# Patient Record
Sex: FEMALE | Race: WHITE | NOT HISPANIC OR LATINO
[De-identification: names, ages, dates, MRNs, and addresses within clinical notes are randomized per-mention and may not be internally consistent; named-entity substitution may affect disease eponyms.]

---

## 2023-01-30 ENCOUNTER — APPOINTMENT (OUTPATIENT)
Dept: ORTHOPEDIC SURGERY | Facility: CLINIC | Age: 78
End: 2023-01-30
Payer: MEDICARE

## 2023-01-30 DIAGNOSIS — E78.00 PURE HYPERCHOLESTEROLEMIA, UNSPECIFIED: ICD-10-CM

## 2023-01-30 DIAGNOSIS — I10 ESSENTIAL (PRIMARY) HYPERTENSION: ICD-10-CM

## 2023-01-30 DIAGNOSIS — M17.12 UNILATERAL PRIMARY OSTEOARTHRITIS, LEFT KNEE: ICD-10-CM

## 2023-01-30 DIAGNOSIS — M25.569 PAIN IN UNSPECIFIED KNEE: ICD-10-CM

## 2023-01-30 PROCEDURE — 20611 DRAIN/INJ JOINT/BURSA W/US: CPT | Mod: LT

## 2023-01-30 PROCEDURE — 99213 OFFICE O/P EST LOW 20 MIN: CPT | Mod: 25

## 2023-01-30 PROCEDURE — 73564 X-RAY EXAM KNEE 4 OR MORE: CPT | Mod: FY,LT

## 2023-01-30 NOTE — PHYSICAL EXAM
[5___] : quadriceps 5[unfilled]/5 [Negative] : negative Fabiano's [Left] : left knee [All Views] : anteroposterior, lateral, skyline, and anteroposterior standing [advanced tricompartmental OA with medial compartment narrowing and varus alignment] : advanced tricompartmental OA with medial compartment narrowing and varus alignment [] : no ecchymosis [FreeTextEntry9] : unchanged from 2021 [TWNoteComboBox7] : flexion 135 degrees

## 2023-01-30 NOTE — HISTORY OF PRESENT ILLNESS
[4] : 4 [Dull/Aching] : dull/aching [Localized] : localized [Throbbing] : throbbing [Nothing helps with pain getting better] : Nothing helps with pain getting better [de-identified] : she has known OA and has done well with csi in past last one in Jan 2021, no injury, worse when more active, uses salonpas and tylenol, she is on blood thinners [] : no [FreeTextEntry1] : left knee

## 2023-01-30 NOTE — DISCUSSION/SUMMARY
[de-identified] : Patient allowed to gently start resuming activities.\par Discussed change to medication prescription and usage. \par Offered cortisone steroid injection. \par Bracing options discussed with patient. \par Hyaluronic Acid inj pamphlet given to pt.\par poss TKA in future

## 2023-05-28 ENCOUNTER — INPATIENT (INPATIENT)
Facility: HOSPITAL | Age: 78
LOS: 4 days | Discharge: HOME | End: 2023-06-02
Attending: STUDENT IN AN ORGANIZED HEALTH CARE EDUCATION/TRAINING PROGRAM | Admitting: STUDENT IN AN ORGANIZED HEALTH CARE EDUCATION/TRAINING PROGRAM
Payer: MEDICARE

## 2023-05-28 ENCOUNTER — TRANSCRIPTION ENCOUNTER (OUTPATIENT)
Age: 78
End: 2023-05-28

## 2023-05-28 VITALS
TEMPERATURE: 100 F | RESPIRATION RATE: 18 BRPM | SYSTOLIC BLOOD PRESSURE: 126 MMHG | OXYGEN SATURATION: 99 % | HEART RATE: 76 BPM | DIASTOLIC BLOOD PRESSURE: 77 MMHG

## 2023-05-28 DIAGNOSIS — S72.002A FRACTURE OF UNSPECIFIED PART OF NECK OF LEFT FEMUR, INITIAL ENCOUNTER FOR CLOSED FRACTURE: ICD-10-CM

## 2023-05-28 DIAGNOSIS — E78.5 HYPERLIPIDEMIA, UNSPECIFIED: ICD-10-CM

## 2023-05-28 DIAGNOSIS — D72.829 ELEVATED WHITE BLOOD CELL COUNT, UNSPECIFIED: ICD-10-CM

## 2023-05-28 DIAGNOSIS — Z01.818 ENCOUNTER FOR OTHER PREPROCEDURAL EXAMINATION: ICD-10-CM

## 2023-05-28 DIAGNOSIS — I10 ESSENTIAL (PRIMARY) HYPERTENSION: ICD-10-CM

## 2023-05-28 DIAGNOSIS — Z29.9 ENCOUNTER FOR PROPHYLACTIC MEASURES, UNSPECIFIED: ICD-10-CM

## 2023-05-28 DIAGNOSIS — I48.91 UNSPECIFIED ATRIAL FIBRILLATION: ICD-10-CM

## 2023-05-28 DIAGNOSIS — E03.9 HYPOTHYROIDISM, UNSPECIFIED: ICD-10-CM

## 2023-05-28 DIAGNOSIS — M19.90 UNSPECIFIED OSTEOARTHRITIS, UNSPECIFIED SITE: ICD-10-CM

## 2023-05-28 DIAGNOSIS — S72.009A FRACTURE OF UNSPECIFIED PART OF NECK OF UNSPECIFIED FEMUR, INITIAL ENCOUNTER FOR CLOSED FRACTURE: ICD-10-CM

## 2023-05-28 LAB
ALBUMIN SERPL ELPH-MCNC: 3.9 G/DL — SIGNIFICANT CHANGE UP (ref 3.3–5)
ALP SERPL-CCNC: 94 U/L — SIGNIFICANT CHANGE UP (ref 40–120)
ALT FLD-CCNC: 11 U/L — SIGNIFICANT CHANGE UP (ref 4–33)
ANION GAP SERPL CALC-SCNC: 13 MMOL/L — SIGNIFICANT CHANGE UP (ref 7–14)
APTT BLD: 37.9 SEC — HIGH (ref 27–36.3)
AST SERPL-CCNC: 19 U/L — SIGNIFICANT CHANGE UP (ref 4–32)
BASOPHILS # BLD AUTO: 0.05 K/UL — SIGNIFICANT CHANGE UP (ref 0–0.2)
BASOPHILS NFR BLD AUTO: 0.4 % — SIGNIFICANT CHANGE UP (ref 0–2)
BILIRUB SERPL-MCNC: 1.1 MG/DL — SIGNIFICANT CHANGE UP (ref 0.2–1.2)
BLD GP AB SCN SERPL QL: NEGATIVE — SIGNIFICANT CHANGE UP
BUN SERPL-MCNC: 19 MG/DL — SIGNIFICANT CHANGE UP (ref 7–23)
CALCIUM SERPL-MCNC: 9.2 MG/DL — SIGNIFICANT CHANGE UP (ref 8.4–10.5)
CHLORIDE SERPL-SCNC: 103 MMOL/L — SIGNIFICANT CHANGE UP (ref 98–107)
CO2 SERPL-SCNC: 22 MMOL/L — SIGNIFICANT CHANGE UP (ref 22–31)
CREAT SERPL-MCNC: 0.81 MG/DL — SIGNIFICANT CHANGE UP (ref 0.5–1.3)
EGFR: 73 ML/MIN/1.73M2 — SIGNIFICANT CHANGE UP
EOSINOPHIL # BLD AUTO: 0.03 K/UL — SIGNIFICANT CHANGE UP (ref 0–0.5)
EOSINOPHIL NFR BLD AUTO: 0.3 % — SIGNIFICANT CHANGE UP (ref 0–6)
GLUCOSE SERPL-MCNC: 129 MG/DL — HIGH (ref 70–99)
HCT VFR BLD CALC: 33.7 % — LOW (ref 34.5–45)
HGB BLD-MCNC: 11.4 G/DL — LOW (ref 11.5–15.5)
IANC: 9.08 K/UL — HIGH (ref 1.8–7.4)
IMM GRANULOCYTES NFR BLD AUTO: 0.4 % — SIGNIFICANT CHANGE UP (ref 0–0.9)
INR BLD: 1.55 RATIO — HIGH (ref 0.88–1.16)
LYMPHOCYTES # BLD AUTO: 1.9 K/UL — SIGNIFICANT CHANGE UP (ref 1–3.3)
LYMPHOCYTES # BLD AUTO: 15.9 % — SIGNIFICANT CHANGE UP (ref 13–44)
MCHC RBC-ENTMCNC: 29.7 PG — SIGNIFICANT CHANGE UP (ref 27–34)
MCHC RBC-ENTMCNC: 33.8 GM/DL — SIGNIFICANT CHANGE UP (ref 32–36)
MCV RBC AUTO: 87.8 FL — SIGNIFICANT CHANGE UP (ref 80–100)
MONOCYTES # BLD AUTO: 0.81 K/UL — SIGNIFICANT CHANGE UP (ref 0–0.9)
MONOCYTES NFR BLD AUTO: 6.8 % — SIGNIFICANT CHANGE UP (ref 2–14)
NEUTROPHILS # BLD AUTO: 9.08 K/UL — HIGH (ref 1.8–7.4)
NEUTROPHILS NFR BLD AUTO: 76.2 % — SIGNIFICANT CHANGE UP (ref 43–77)
NRBC # BLD: 0 /100 WBCS — SIGNIFICANT CHANGE UP (ref 0–0)
NRBC # FLD: 0 K/UL — SIGNIFICANT CHANGE UP (ref 0–0)
PLATELET # BLD AUTO: 239 K/UL — SIGNIFICANT CHANGE UP (ref 150–400)
POTASSIUM SERPL-MCNC: 3.4 MMOL/L — LOW (ref 3.5–5.3)
POTASSIUM SERPL-SCNC: 3.4 MMOL/L — LOW (ref 3.5–5.3)
PROT SERPL-MCNC: 6.7 G/DL — SIGNIFICANT CHANGE UP (ref 6–8.3)
PROTHROM AB SERPL-ACNC: 18.1 SEC — HIGH (ref 10.5–13.4)
RBC # BLD: 3.84 M/UL — SIGNIFICANT CHANGE UP (ref 3.8–5.2)
RBC # FLD: 12.8 % — SIGNIFICANT CHANGE UP (ref 10.3–14.5)
RH IG SCN BLD-IMP: POSITIVE — SIGNIFICANT CHANGE UP
SODIUM SERPL-SCNC: 138 MMOL/L — SIGNIFICANT CHANGE UP (ref 135–145)
WBC # BLD: 11.92 K/UL — HIGH (ref 3.8–10.5)
WBC # FLD AUTO: 11.92 K/UL — HIGH (ref 3.8–10.5)

## 2023-05-28 PROCEDURE — 73552 X-RAY EXAM OF FEMUR 2/>: CPT | Mod: 26,LT

## 2023-05-28 PROCEDURE — 71045 X-RAY EXAM CHEST 1 VIEW: CPT | Mod: 26

## 2023-05-28 PROCEDURE — 99223 1ST HOSP IP/OBS HIGH 75: CPT | Mod: GC

## 2023-05-28 PROCEDURE — 99285 EMERGENCY DEPT VISIT HI MDM: CPT | Mod: GC

## 2023-05-28 PROCEDURE — 73502 X-RAY EXAM HIP UNI 2-3 VIEWS: CPT | Mod: 26,LT

## 2023-05-28 PROCEDURE — 73721 MRI JNT OF LWR EXTRE W/O DYE: CPT | Mod: 26,LT

## 2023-05-28 PROCEDURE — 70450 CT HEAD/BRAIN W/O DYE: CPT | Mod: 26,MA

## 2023-05-28 PROCEDURE — 72192 CT PELVIS W/O DYE: CPT | Mod: 26,MA

## 2023-05-28 RX ORDER — ACETAMINOPHEN 500 MG
650 TABLET ORAL EVERY 6 HOURS
Refills: 0 | Status: DISCONTINUED | OUTPATIENT
Start: 2023-05-28 | End: 2023-05-29

## 2023-05-28 RX ORDER — SIMVASTATIN 20 MG/1
20 TABLET, FILM COATED ORAL AT BEDTIME
Refills: 0 | Status: DISCONTINUED | OUTPATIENT
Start: 2023-05-28 | End: 2023-06-02

## 2023-05-28 RX ORDER — LEVOTHYROXINE SODIUM 125 MCG
75 TABLET ORAL DAILY
Refills: 0 | Status: DISCONTINUED | OUTPATIENT
Start: 2023-05-28 | End: 2023-06-02

## 2023-05-28 RX ORDER — POTASSIUM CHLORIDE 20 MEQ
40 PACKET (EA) ORAL ONCE
Refills: 0 | Status: COMPLETED | OUTPATIENT
Start: 2023-05-28 | End: 2023-05-28

## 2023-05-28 RX ORDER — METOPROLOL TARTRATE 50 MG
1 TABLET ORAL
Refills: 0 | DISCHARGE

## 2023-05-28 RX ORDER — METOPROLOL TARTRATE 50 MG
5 TABLET ORAL ONCE
Refills: 0 | Status: COMPLETED | OUTPATIENT
Start: 2023-05-28 | End: 2023-05-28

## 2023-05-28 RX ORDER — POVIDONE-IODINE 5 %
1 AEROSOL (ML) TOPICAL ONCE
Refills: 0 | Status: DISCONTINUED | OUTPATIENT
Start: 2023-05-29 | End: 2023-06-02

## 2023-05-28 RX ORDER — LANOLIN ALCOHOL/MO/W.PET/CERES
3 CREAM (GRAM) TOPICAL AT BEDTIME
Refills: 0 | Status: DISCONTINUED | OUTPATIENT
Start: 2023-05-28 | End: 2023-06-02

## 2023-05-28 RX ORDER — RIVAROXABAN 15 MG-20MG
1 KIT ORAL
Refills: 0 | DISCHARGE

## 2023-05-28 RX ORDER — CHLORHEXIDINE GLUCONATE 213 G/1000ML
1 SOLUTION TOPICAL ONCE
Refills: 0 | Status: COMPLETED | OUTPATIENT
Start: 2023-05-29 | End: 2023-05-29

## 2023-05-28 RX ORDER — LEVOTHYROXINE SODIUM 125 MCG
1 TABLET ORAL
Refills: 0 | DISCHARGE

## 2023-05-28 RX ORDER — METOPROLOL TARTRATE 50 MG
25 TABLET ORAL DAILY
Refills: 0 | Status: DISCONTINUED | OUTPATIENT
Start: 2023-05-28 | End: 2023-05-28

## 2023-05-28 RX ORDER — METOPROLOL TARTRATE 50 MG
25 TABLET ORAL DAILY
Refills: 0 | Status: DISCONTINUED | OUTPATIENT
Start: 2023-05-28 | End: 2023-05-29

## 2023-05-28 RX ADMIN — Medication 0.5 MILLIGRAM(S): at 21:12

## 2023-05-28 RX ADMIN — Medication 40 MILLIEQUIVALENT(S): at 17:24

## 2023-05-28 RX ADMIN — SIMVASTATIN 20 MILLIGRAM(S): 20 TABLET, FILM COATED ORAL at 23:55

## 2023-05-28 RX ADMIN — Medication 5 MILLIGRAM(S): at 23:55

## 2023-05-28 NOTE — H&P ADULT - PROBLEM SELECTOR PLAN 7
Hx hypothyroidism  home regimen: Synthroid 75mcg QD    Plan:  -F/U outpatient, pt is monitored closely no need for labs here  -C/W home synthroid 75mcg QD Hx hypothyroidism  Home regimen: Synthroid 75mcg QD    Plan:  -F/u TSH  -C/w home Synthroid 75mcg QD

## 2023-05-28 NOTE — ED ADULT NURSE NOTE - DRUG PRE-SCREENING (DAST -1)
What Type Of Note Output Would You Prefer (Optional)?: Bullet Format How Severe Are Your Spot(S)?: moderate Hpi Title: Evaluation of Skin Lesions Location: Right leg and right distal forearm Year Removed: 2000 and 2018 Statement Selected

## 2023-05-28 NOTE — ED ADULT NURSE REASSESSMENT NOTE - NS ED NURSE REASSESS COMMENT FT1
Spoke to MD Do and made aware of EKG. Patient in no distress. Nursing care continues
pt A&ox4, denies chest pain and SOB. no complaints of pain. denies headache, dizziness ,lightheadedness, radiating chest pain. breathing is spontaneous and unlabored. bed in lowest position, call bell within reach, siderails up x2. no new bruisiing, swelling, or hematoma noted to left leg. pt awaiting CT results.

## 2023-05-28 NOTE — H&P ADULT - PROBLEM SELECTOR PLAN 1
S/p Mechanical fall  Xray with Acute displaced fracture through the left greater trochanter.  CT bony pelvis w/ Comminuted displaced fracture of the left femoral greater trochanter.   CTH wnl     Plan   -MRI (premedicated with ativan due to anxiety)  -F/U Ortho recs, potential OR tomorrow  -NPO@MN  -Preop Labs  -PT eval postop S/p Mechanical fall; no headstrike, no LOC   Xray with Acute displaced fracture through the left greater trochanter.  CT bony pelvis w/ Comminuted displaced fracture of the left femoral greater trochanter.   CTH wnl     Plan   -MRI L hip to r/o IT extension (premedicated with ativan due to anxiety)- performed F/U Read   -F/U Ortho recs, potential OR tomorrow  -NPO@MN  -Preop Labs  -PT eval postop S/p mechanical fall with no head strike, no LOC   Xray with acute displaced fracture through the left greater trochanter. CT bony pelvis w/ comminuted displaced fracture of the left femoral greater trochanter.   CTH noncontrast wnl     Plan   -MRI L hip to r/o IT extension (premedicated with Ativan due to anxiety), performed. F/u read.  -Ortho consulted, f/u Ortho recs, potential OR tomorrow  -Pain control with PO Tylenol PRN, oxycodone 2.5mg q6hrs PRN for moderate pain, and oxycodone 5mg q6hrs PRN for severe pain  -NPO@MN  -Preop labs  -PT eval postop

## 2023-05-28 NOTE — H&P ADULT - ASSESSMENT
Note incomplete; pt to be seen and assessed; plan tentative      78F hx Afib (on xarleo LD __), HTN, HLD c/o L hip pain s/p mechanical fall 4 days ago. Since the incident, able to partially bear weight in the LLE with a walker (denies walker use at baseline, reports she ambulates w/o assistive devices normally). Denies headstrike or LOC. Denies numbness/tingling in the LLE. Denies any other trauma/injuries at this time.    ED Course: febrile, HR 70s-80s, SBP 120s-140s/70s-90s, sat 99 on RA , RR 18.  Recieved oral potassium supplementation    #Hip Fracture  fall    Plan   -MRI  -Ortho  -NPO@MN  -Preop Labs  -PT postop     ##Medical optimization  ECG  chadvasc  ahsbleed  afib  htn  hld  RCRI       #HTN  Home regimen: ?    #HLD  Home regimen: ?    Plan  -Lipid Panel     #Prophylactic  DVT: Hold  Diet: DASH until MN then NPO  Endo: A1C  CV: Lipid Panel    Dispo: inpatient for OR    Outpatient follow up:  PCP  ?cards  Ortho 78F hx Afib (on Xarelto LD 5/27), HTN, HLD, OA (L knee; cortisone shot 2/23) c/o L hip pain s/p mechanical fall 4 days ago. Limited weight bearing. Xray with Acute displaced fracture through the left greater trochanter.  CT w/ Comminuted displaced fracture of the left femoral greater trochanter.       ED Course: febrile, HR 70s-80s, SBP 120s-140s/70s-90s, sat 99 on RA , RR 18. Received oral potassium supplementation. Ativan for anxiety pre MRI.   78F hx Afib (on Xarelto LD 5/27), HTN, HLD, OA (L knee; cortisone shot 2/23) c/o L hip pain s/p mechanical fall 4 days ago. Limited weight bearing. Xray with acute displaced fracture through the left greater trochanter. CT w/ comminuted displaced fracture of the left femoral greater trochanter.       ED Course: febrile, HR 70s-80s, SBP 120s-140s/70s-90s, sat 99 on RA , RR 18. Received oral potassium supplementation. Ativan for anxiety pre MRI.

## 2023-05-28 NOTE — ED PROVIDER NOTE - OBJECTIVE STATEMENT
78 year old with history of HTN, HLD, Afib on Xarelto, presenting from  for possible hip fracture s/p fall. 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

## 2023-05-28 NOTE — H&P ADULT - PROBLEM SELECTOR PLAN 8
Leuckocytosis on admission  no fevers  did feel a little cold day before admission  no chills here  CXR with clear lungs  no localizing infectious symptoms     Plan  -CTM off antibiotics Leukocytosis on admission, with WBC 11.92.  Pt afebrile, did feel a little cold day before admission, no chills here.  CXR with clear lungs.  No localizing infectious symptoms.   Likely reactive in setting of acute L hip fracture.     Plan:  -Check UA (though pt not reporting any urinary symptoms)  -Trend CBC   -CTM off antibiotics

## 2023-05-28 NOTE — ED ADULT NURSE NOTE - SUICIDE SCREENING QUESTION 2
Pachymetry Report   [phone]    Indication: Glaucoma    Right eye:  Central corneal thickness: 577 in microns  Classification: Thicker than average  Interpretation: True IOP may be meaningfully lower than indicated by tonometry    Left eye:  Central corneal thickness: 522 in microns  Classification: Average  Interpretation: Tonometric readings are sufficiently accurate      Judit Catalan, OD   No

## 2023-05-28 NOTE — H&P ADULT - NSHPPHYSICALEXAM_GEN_ALL_CORE
Note incomplete; pt to be seen and assessed; plan tentative T(C): 37.4 (05-28-23 @ 21:12), Max: 37.7 (05-28-23 @ 15:21)  HR: 121 (05-28-23 @ 21:12) (76 - 121)  BP: 125/67 (05-28-23 @ 21:12) (125/67 - 147/96)  RR: 19 (05-28-23 @ 21:12) (18 - 19)  SpO2: 99% (05-28-23 @ 21:12) (99% - 99%)    CONSTITUTIONAL: anxious regarding MRI and possible OR   EYES:, EOMI, No conjunctival or scleral injection, non-icteric  ENMT: Oral mucosa with moist membranes. Normal dentition; no pharyngeal injection or exudates   NECK: symmetric and without tracheal deviation   RESP: No respiratory distress, no use of accessory muscles; CTA b/l, no WRR  CV: Fast and irregular, no murmurs   GI: Soft, NT, ND, no rebound, no guarding; no palpable masses; no hepatosplenomegaly; no hernia palpated  MSK: TTP at left hip, WWP distally  SKIN: +ecchymosis expanding form L hip past L knee; otherwise no rashes or ulcers noted; no subcutaneous nodules or induration palpable  NEURO: CARRERO, ambulatory with some weight bearing, EOMI, conversational, no facial assymetry, speech clear and fluent, follows all commands   PSYCH: Appropriate insight/judgment; A+O x 3, mood and affect appropriate, recent/remote memory intact T(C): 37.4 (05-28-23 @ 21:12), Max: 37.7 (05-28-23 @ 15:21)  HR: 121 (05-28-23 @ 21:12) (76 - 121)  BP: 125/67 (05-28-23 @ 21:12) (125/67 - 147/96)  RR: 19 (05-28-23 @ 21:12) (18 - 19)  SpO2: 99% (05-28-23 @ 21:12) (99% - 99%)    CONSTITUTIONAL: Awake and alert, NAD, anxious regarding MRI and possible OR   HEAD: Normocephalic, atraumatic  EYES: EOMI, PERRL, no conjunctival or scleral injection, non-icteric  MOUTH: Oral mucosa with moist membranes. Normal dentition. No pharyngeal injection or exudates.  NECK: Supple, full ROM, no JVD, no LAD, symmetric and without tracheal deviation   RESP: No respiratory distress, no use of accessory muscles. Good inspiratory effort, CTA b/l, no WRR.  CV: Irregularly irregular. S1 and S2 presents. No murmurs, rubs, or gallops. No LE pitting edema b/l.   GI: BS+, soft, NT, ND, no rebound, no guarding. No palpable masses. No hepatosplenomegaly.  MSK:TTP at left hip, WWP distally. No spinal or paraspinal tenderness.  SKIN: Ecchymosis extending from L hip past L knee. No rashes or ulcers.  NEURO: No facial asymmetry, CARRERO, tactile sensation intact in all extremities, ambulatory with some weight bearing, conversational, speech clear and fluent, follows all commands   PSYCH: Appropriate insight/judgment; A&O x 3, mood and affect appropriate, recent/remote memory intact

## 2023-05-28 NOTE — CONSULT NOTE ADULT - SUBJECTIVE AND OBJECTIVE BOX
HPI  78yFemale c/o L hip pain s/p mechanical fall 4 days ago. Since the incident, able to partially bear weight in the LLE with a walker (denies walker use at baseline, reports she ambulates w/o assistive devices normally). Denies headstrike or LOC. Denies numbness/tingling in the LLE. Denies any other trauma/injuries at this time.    ROS  Negative unless otherwise specified in HPI.    PAST MEDICAL & SURGICAL Hx  PAST MEDICAL & SURGICAL HISTORY:    MEDICATIONS  Home Medications:  busPIRone: 15 milligram(s) orally once a day. 1/2 tablet (05 Jan 2014 13:58)  levothyroxine 88 mcg (0.088 mg) oral capsule: 1 cap(s) orally once a day (05 Jan 2014 13:58)  simvastatin 20 mg oral tablet: 1 tab(s) orally once a day (at bedtime) (05 Jan 2014 13:58)  verapamil 40 mg oral tablet: tab(s) orally once a day (05 Jan 2014 13:58)    VITALS  Vital Signs Last 24 Hrs  T(C): 36.9 (28 May 2023 17:27), Max: 37.7 (28 May 2023 15:21)  T(F): 98.4 (28 May 2023 17:27), Max: 99.9 (28 May 2023 15:21)  HR: 84 (28 May 2023 17:27) (76 - 84)  BP: 147/96 (28 May 2023 17:27) (126/77 - 147/96)  RR: 18 (28 May 2023 17:27) (18 - 18)  SpO2: 99% (28 May 2023 17:27) (99% - 99%)  Parameters below as of 28 May 2023 17:27  Patient On (Oxygen Delivery Method): room air    PHYSICAL EXAM  Gen: Lying in bed, NAD  Resp: No increased WOB  LLE:  Skin intact, +edema and +ecchymosis over hip and down thigh  +Mild TTP over L hip, no TTP along remainder of extremity; compartments soft  (-) Log roll test  (-) Pain with axial loading  Motor: TA/EHL/GS/FHL intact  Sensory: DP/SP/Tib/Louann/Saph SILT  +DP pulse, WWP    Secondary survey:  No TTP along other extremities, pelvis grossly stable, SILT and compartments soft throughout    LABS                        11.4   11.92 )-----------( 239      ( 28 May 2023 14:35 )             33.7     05-28    138  |  103  |  19  ----------------------------<  129<H>  3.4<L>   |  22  |  0.81    Ca    9.2      28 May 2023 14:35    TPro  6.7  /  Alb  3.9  /  TBili  1.1  /  DBili  x   /  AST  19  /  ALT  11  /  AlkPhos  94  05-28    PT/INR - ( 28 May 2023 14:35 )   PT: 18.1 sec;   INR: 1.55 ratio    PTT - ( 28 May 2023 14:35 )  PTT:37.9 sec    IMAGING  XRs: L greater troch fx, unable to determine if IT extension present on these XRs alone (personal read)    ASSESSMENT & PLAN  78yFemale w/ L greater troch fx.  -NWB LLE, bedrest until MRI  -MRI L hip to r/o IT extension  -preop in case MRI shows IT extension: CBC, BMP, coags, T&S x2, CXR, EKG  -NPO past midnight, IVF  -hold chemical DVT ppx for possible OR; SCDs OK  -please document medical clearance for OR in case requires surgery  -pain control  -ice/cold compress HPI  78yFemale c/o L hip pain s/p mechanical fall 4 days ago. Since the incident, able to partially bear weight in the LLE with a walker (denies walker use at baseline, reports she ambulates w/o assistive devices normally). Denies headstrike or LOC. Denies numbness/tingling in the LLE. Denies any other trauma/injuries at this time.    ROS  Negative unless otherwise specified in HPI.    PAST MEDICAL & SURGICAL Hx  PAST MEDICAL & SURGICAL HISTORY:    MEDICATIONS  Home Medications:  busPIRone: 15 milligram(s) orally once a day. 1/2 tablet (05 Jan 2014 13:58)  levothyroxine 88 mcg (0.088 mg) oral capsule: 1 cap(s) orally once a day (05 Jan 2014 13:58)  simvastatin 20 mg oral tablet: 1 tab(s) orally once a day (at bedtime) (05 Jan 2014 13:58)  verapamil 40 mg oral tablet: tab(s) orally once a day (05 Jan 2014 13:58)    VITALS  Vital Signs Last 24 Hrs  T(C): 36.9 (28 May 2023 17:27), Max: 37.7 (28 May 2023 15:21)  T(F): 98.4 (28 May 2023 17:27), Max: 99.9 (28 May 2023 15:21)  HR: 84 (28 May 2023 17:27) (76 - 84)  BP: 147/96 (28 May 2023 17:27) (126/77 - 147/96)  RR: 18 (28 May 2023 17:27) (18 - 18)  SpO2: 99% (28 May 2023 17:27) (99% - 99%)  Parameters below as of 28 May 2023 17:27  Patient On (Oxygen Delivery Method): room air    PHYSICAL EXAM  Gen: Lying in bed, NAD  Resp: No increased WOB  LLE:  Skin intact, +edema and +ecchymosis over hip and down thigh  +Mild TTP over L hip, no TTP along remainder of extremity; compartments soft  (-) Log roll test  (-) Pain with axial loading  Motor: TA/EHL/GS/FHL intact  Sensory: DP/SP/Tib/Louann/Saph SILT  +DP pulse, WWP    Secondary survey:  No TTP along other extremities, pelvis grossly stable, SILT and compartments soft throughout    LABS                        11.4   11.92 )-----------( 239      ( 28 May 2023 14:35 )             33.7     05-28    138  |  103  |  19  ----------------------------<  129<H>  3.4<L>   |  22  |  0.81    Ca    9.2      28 May 2023 14:35    TPro  6.7  /  Alb  3.9  /  TBili  1.1  /  DBili  x   /  AST  19  /  ALT  11  /  AlkPhos  94  05-28    PT/INR - ( 28 May 2023 14:35 )   PT: 18.1 sec;   INR: 1.55 ratio    PTT - ( 28 May 2023 14:35 )  PTT:37.9 sec    IMAGING  XRs: L greater troch fx, unable to determine if IT extension present on these XRs alone (personal read)    ASSESSMENT & PLAN  78yFemale w/ L greater troch fx.  -NWB LLE, bedrest until MRI  -L hip abduction precautions  -MRI L hip to r/o IT extension  -preop in case MRI shows IT extension: CBC, BMP, coags, T&S x2, CXR, EKG  -NPO past midnight, IVF  -hold chemical DVT ppx for possible OR; SCDs OK  -please document medical clearance for OR in case requires surgery  -pain control  -ice/cold compress

## 2023-05-28 NOTE — H&P ADULT - NSHPREVIEWOFSYSTEMS_GEN_ALL_CORE
Note incomplete; pt to be seen and assessed; plan tentative REVIEW OF SYSTEMS:    CONSTITUTIONAL: No weakness, fevers or chills  EYES/ENT: No visual changes;  No vertigo or throat pain   NECK: No pain or stiffness  RESPIRATORY: No cough, wheezing, hemoptysis; No shortness of breath  CARDIOVASCULAR: No chest pain or palpitations  GASTROINTESTINAL: No abdominal or epigastric pain. No nausea, vomiting, or hematemesis; No diarrhea or constipation. No melena or hematochezia.  GENITOURINARY: No dysuria, frequency or hematuria  NEUROLOGICAL: No numbness or weakness  SKIN: +ecchymosis expanding form L hip past L knee; Otherwise no itching, burning, rashes, or lesions   HEME: no easy bruising or unexplained bleeding  PSYCH: +anxiety re MRI and OR plan   All other review of systems is negative unless indicated above. REVIEW OF SYSTEMS:    CONSTITUTIONAL: No weakness, fevers or chills  EYES/ENT: No visual changes;  No vertigo or throat pain   NECK: No pain or stiffness  RESPIRATORY: No cough, wheezing, hemoptysis; No shortness of breath  CARDIOVASCULAR: No chest pain or palpitations  GASTROINTESTINAL: No abdominal or epigastric pain. No nausea, vomiting, or hematemesis; No diarrhea or constipation. No melena or hematochezia.  GENITOURINARY: No dysuria, frequency or hematuria  NEUROLOGICAL: No numbness or weakness  SKIN: +Ecchymosis extending form L hip past L knee; Otherwise no itching, burning, rashes, or lesions   HEME: No easy bruising or unexplained bleeding  PSYCH: +Anxiety re MRI and OR plan   All other review of systems is negative unless indicated above.

## 2023-05-28 NOTE — H&P ADULT - PROBLEM SELECTOR PLAN 9
#Prophylactic  DVT: Hold pharm agents   Diet: DASH until MN then NPO    Dispo: inpatient for possible OR    Outpatient follow up:  PCP: Dr. Osorio Watson  Cards: Tc Castillo MD  Ortho: IKE VASQUEZ MD   Pharmacy: [address] [id number] #Prophylactic  DVT ppx: On Xarelto at home. Currently holding pending possible OR.    Diet: DASH until MN then NPO    Dispo: inpatient for possible OR    Outpatient follow up:  PCP: Dr. Osorio Watson  Cards: Tc Castillo MD  Ortho: IKE VASQUEZ MD   Pharmacy: [address] [id number]

## 2023-05-28 NOTE — H&P ADULT - ATTENDING COMMENTS
79 yo F with history of Afib (on Xarelto LD 5/27), HTN, HLD, and OA (L knee; cortisone shot 2/23) c/o L hip pain s/p mechanical fall 4 days ago, found to have an acute comminuted displaced fracture of the left femoral greater trochanter. MRI L hip ordered to r/o IT extension. Ortho following, possible OR in AM. Pt, however, not currently optimized, as pt in afib with intermittent RVR to 130s, likely driven by anxiety and pain. Cardiology consult obtained overnight. TTE ordered. Will need further evaluation and monitoring prior to OR. Pt's Xarelto on hold pending possible OR. Monitor on tele.

## 2023-05-28 NOTE — CONSULT NOTE ADULT - ATTENDING COMMENTS
s/p ground level fall with displaced L GT fx with IT extension on MRI.  Apprised to risks and benefits of operative intervention and has elected to proceed with short intramedullary nail.  Xarelto has been held for past 48 hours.  Echo wnl.  Medical clearance obtained.

## 2023-05-28 NOTE — H&P ADULT - PROBLEM SELECTOR PLAN 6
follows with ortho IKE VASQUEZ MD  OA of left knee, chronic  Improved s./p cortisone shot in february    Plan:  -Pain control if needed follows with ortho IKE VASQUEZ MD  OA of left knee, chronic  Improved s/p cortisone shot in february    Plan:  -Pain control if needed Follows with Ortho IKE VASQUEZ MD  OA of left knee, chronic.  Improved s/p cortisone shot in February.    Plan:  -Pain control if needed

## 2023-05-28 NOTE — ED ADULT TRIAGE NOTE - CHIEF COMPLAINT QUOTE
Pt AOX4 c/o Left hip pain and injury s/p trip and fall on 5/24, fell on hard floor, takes Xarelto for Afib, has extensive bruising on Left side of leg and hip, seen in urgi-care and sent to ER for eval of fracture and possible infection (pt had chills at home - oral temp 98.4); is ambulating with cane since injury  PMHx of Hypothyroid, HTN, AFib

## 2023-05-28 NOTE — H&P ADULT - NSHPLABSRESULTS_GEN_ALL_CORE
11.4   11.92 )-----------( 239      ( 28 May 2023 14:35 )             33.7     138  |  103  |  19  ----------------------------<  129<H>  3.4<L>   |  22  |  0.81    Ca    9.2      28 May 2023 14:35  TPro  6.7  /  Alb  3.9  /  TBili  1.1  /  DBili  x   /  AST  19  /  ALT  11  /  AlkPhos  94  05-28  PT/INR - ( 28 May 2023 14:35 )   PT: 18.1 sec;   INR: 1.55 ratio     PTT - ( 28 May 2023 14:35 )  PTT:37.9 sec    CAPILLARY BLOOD GLUCOSE    < from: Xray Hip w/ Pelvis 2 or 3 Views, Left (05.28.23 @ 15:10) >    FINDINGS: There is a displaced fracture through the left greater   trochanter. No other fracture or dislocation is seen in the pelvis, left   hip, and left femur. The bilateral hip joint spaces are intact. No   osseous lesion is seen.    IMPRESSION:  Acute displaced fracture through the left greater trochanter.    --- End of Report ---    < end of copied text >    < from: CT Head No Cont (05.28.23 @ 17:03) >      TECHNIQUE: Multiple axial images were obtained at 5 mm intervals from the   skull base to the vertex. Sagittal and coronal reformatted images were   obtained from the axial data set. The images were reviewed in brain and   bone windows.    COMPARISON: None available    FINDINGS: The CT examination demonstrates the ventricles, cisternal   spaces, and cortical sulci to be within normal limits. There is no   midline shift or extra axial collections. The gray white differentiation   appears within normal limits. There is no intracranial hemorrhage or   acute transcortical infarct. The bony windows demonstrates no fractures.   The visualized paranasal sinuses are within normal limits. The mastoid   air cells are well aerated.    IMPRESSION: No acute intracranial abnormality.    < end of copied text >      < from: CT Pelvis Bony Only No Cont (05.28.23 @ 17:04) >    FINDINGS:    Acute comminuted displaced fracture of the left greater trochanter. Left   femoral head remains seated within the acetabulum. Mild bilateral hip   arthrosis. Degenerative changes within the lower lumbar spine, sacroiliac   joints, pubic symphysis.    Soft tissue swelling/hematoma surrounding the fracture site.    Colonic diverticulosis. Leiomyomatous uterus. Atherosclerotic   calcifications are noted.    IMPRESSION:    Comminuted displaced fracture of the left femoral greater trochanter.   Recommend further evaluation with left hip MRI to determine if there is   extension of the fracture into the intertrochanteric region of the left   femoral neck.    --- End of Report ---    < end of copied text > Labs personally reviewed.             11.4 11.92 )-----------( 239      ( 28 May 2023 14:35 )             33.7     138  |  103  |  19  ----------------------------<  129<H>  3.4<L>   |  22  |  0.81    Ca    9.2      28 May 2023 14:35  TPro  6.7  /  Alb  3.9  /  TBili  1.1  /  DBili  x   /  AST  19  /  ALT  11  /  AlkPhos  94  05-28  PT/INR - ( 28 May 2023 14:35 )   PT: 18.1 sec;   INR: 1.55 ratio     PTT - ( 28 May 2023 14:35 )  PTT:37.9 sec    CAPILLARY BLOOD GLUCOSE    Imaging personally reviewed.  < from: Xray Hip w/ Pelvis 2 or 3 Views, Left (05.28.23 @ 15:10) >    FINDINGS: There is a displaced fracture through the left greater   trochanter. No other fracture or dislocation is seen in the pelvis, left   hip, and left femur. The bilateral hip joint spaces are intact. No   osseous lesion is seen.    IMPRESSION:  Acute displaced fracture through the left greater trochanter.    --- End of Report ---    < end of copied text >    < from: CT Head No Cont (05.28.23 @ 17:03) >      TECHNIQUE: Multiple axial images were obtained at 5 mm intervals from the   skull base to the vertex. Sagittal and coronal reformatted images were   obtained from the axial data set. The images were reviewed in brain and   bone windows.    COMPARISON: None available    FINDINGS: The CT examination demonstrates the ventricles, cisternal   spaces, and cortical sulci to be within normal limits. There is no   midline shift or extra axial collections. The gray white differentiation   appears within normal limits. There is no intracranial hemorrhage or   acute transcortical infarct. The bony windows demonstrates no fractures.   The visualized paranasal sinuses are within normal limits. The mastoid   air cells are well aerated.    IMPRESSION: No acute intracranial abnormality.    < end of copied text >      < from: CT Pelvis Bony Only No Cont (05.28.23 @ 17:04) >    FINDINGS:    Acute comminuted displaced fracture of the left greater trochanter. Left   femoral head remains seated within the acetabulum. Mild bilateral hip   arthrosis. Degenerative changes within the lower lumbar spine, sacroiliac   joints, pubic symphysis.    Soft tissue swelling/hematoma surrounding the fracture site.    Colonic diverticulosis. Leiomyomatous uterus. Atherosclerotic   calcifications are noted.    IMPRESSION:    Comminuted displaced fracture of the left femoral greater trochanter.   Recommend further evaluation with left hip MRI to determine if there is   extension of the fracture into the intertrochanteric region of the left   femoral neck.

## 2023-05-28 NOTE — H&P ADULT - PROBLEM SELECTOR PLAN 2
##Medical optimization    3.9 %  30-day risk of death, MI, or cardiac arrest  From [place] 2017, based on pooled data from 5 high quality external validations (4 prospective). These numbers are higher than those often quoted from the now-outdated original study (Sen 1999). See Evidence for details.    No further workup or intervention recommended prior to procedure. Risks and benefits of procedure to be discussed between surgical team and patient. ##Medical optimization    3.9 %  30-day risk of death, MI, or cardiac arrest  From [place] 2017, based on pooled data from 5 high quality external validations (4 prospective). These numbers are higher than those often quoted from the now-outdated original study (Sen 1999). See Evidence for details.    No further medical workup or intervention recommended prior to procedure. Risks and benefits of procedure to be discussed between surgical team and patient.    Pending cardiac eval for cardiac optimization ##Medical optimization  RCRI:   3.9 %  30-day risk of death, MI, or cardiac arrest  From [place] 2017, based on pooled data from 5 high quality external validations (4 prospective). These numbers are higher than those often quoted from the now-outdated original study (Sen 1999). See Evidence for details.    If Heart Rate well controlled by morning then, no further medical workup or intervention recommended prior to procedure.   If Heart rate remains elevated would recommend further medical management prior to procedure.    Pending cardiac eval for cardiac optimization, will call in early morning when cards team available for clearance requests.    Full Risks and benefits of procedure to be discussed between surgical team and patient. - Pt currently in afib with intermittent RVR to 130s. Therefore, Cardiology consult called overnight and TTE ordered. Pt not medically optimized at this time to go to OR. F/u Cardiology recs and TTE results.  - If pt no longer in afib with RVR, then pt's RCRI score 0 (class I risk), equating to a 3.9 %30-day risk of death, MI, or cardiac arrest following surgery.  From [place] 2017, based on pooled data from 5 high quality external validations (4 prospective). These numbers are higher than those often quoted from the now-outdated original study (Sen 1999). See Evidence for details.  - Pt with good functional capacity, with METs at least 4  - Full Risks and benefits of procedure to be discussed between surgical team and pt

## 2023-05-28 NOTE — ED PROVIDER NOTE - ATTENDING CONTRIBUTION TO CARE
This is a pleasant female who is 78 years old she has a history of hypertension hyperlipidemia A-fib on Xarelto.  Patient states that 4 days ago while working she tripped over a students leg and landed on her left side.  Patient states that she was able to ambulate after however she noticed some pain especially when she was going from a sitting to a standing position.  Patient denies any head trauma any loss of consciousness any new pain in her knee or ankle any shortness of breath or chest pain.  Patient states this was mechanical in nature she did not pass out.  Patient noticed extensive bruising of her left lower extremity therefore she went to urgent care where they performed an x-ray of her hip and knee.  X-ray showing fracture of the hip therefore she was told to come to the emergency department for further work-up.  We will obtain basic imaging here as well.  Due to the fact that the patient is on blood thinners although she is neurologically intact we will obtain a CT of the brain.  We will obtain preop labs as this is usually an operative injury.  We have communicated this with the patient as well as her family member which is her son who is at bedside. Last ate last night. Has not taking her blood thinner today.   General: Well appearing, well nourished  Head: Normocephalic, atraumatic  Eyes: Conjunctiva clear, sclera non-icteric, EOM intact, PERRL  Nose: No external lesions, mucosa non-inflamed, septum and turbinates normal  Mouth: Mucous membranes moist, no mucosal lesions.  Neck: Supple  Heart: irreg, no murmur or gallop  Lungs: Clear to auscultation  Abdomen: Bowel sounds present, soft, no tenderness, non distended, no organomegaly, masses  Back: Spine normal without deformity or tenderness, no CVA tenderness  Extremities: No amputations or deformities,  peripheral pulses intact  Musculoskeletal: No crepitation, defects, decreased range of motion, strength intact throughout, pulses intact  LLE- extensive bruising to the LLE compartments are soft no hematoma is felt no ttp no pain with movement senstion and pulses intact knee has no ttp or deformity ankle has no ttp or deformity pt is able to ambulate   Neurologic: No gross deficits CN II-XII intact Sensation to pain, touch, and proprioception normal  Psychiatric: Oriented X3, normal mood and affect  Skin: Warm,dry. Good turgor, , Cap refill <2 seconds

## 2023-05-28 NOTE — H&P ADULT - PROBLEM SELECTOR PLAN 5
Home regimen: Simvastatin 20mg qhS    Plan:  -F/U outpatient, pt is monitored closely no need for labs here  -C/W Simvastatin 20mg qhS Home regimen: Simvastatin 20mg qHS    Plan:  -F/u outpatient, pt is monitored closely, no need for labs here  -C/w Simvastatin 20mg qHS

## 2023-05-28 NOTE — H&P ADULT - NSHPSOCIALHISTORY_GEN_ALL_CORE
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

## 2023-05-28 NOTE — H&P ADULT - HISTORY OF PRESENT ILLNESS
Note incomplete; pt to be seen and assessed; plan tentative    78F hx Afib (on xarleo LD __), HTN, HLD c/o L hip pain s/p mechanical fall 4 days ago. Since the incident, able to partially bear weight in the LLE with a walker (denies walker use at baseline, reports she ambulates w/o assistive devices normally). Denies headstrike or LOC. Denies numbness/tingling in the LLE. Denies any other trauma/injuries at this time.    ED Course: febrile, HR 70s-80s, SBP 120s-140s/70s-90s, sat 99 on RA , RR 18.  Recieved oral potassium supplementation 78F hx Afib (on Xarelto LD 5/27), HTN, HLD, OA (L knee; cortisone shot 2/23) c/o L hip pain s/p mechanical fall 4 days ago. 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 Denies headstrike or LOC. Since then noticed echymosis on Left hip etending down past knee. No pain at rest, but pain with certain positions including walking. She is  able to partially bear weight in the LLE with a walker (denies walker use at baseline, reports she ambulates w/o assistive devices normally).  Denies numbness/tingling in the LLE. Denies any other trauma/injuries at this time.    ED Course: febrile, HR 70s-80s, SBP 120s-140s/70s-90s, sat 99 on RA , RR 18. Received oral potassium supplementation. Ativan for anxiety pre MRI. 78F hx Afib (on Xarelto LD 5/27), HTN, HLD, OA (L knee; cortisone shot 2/23) c/o L hip pain s/p mechanical fall 4 days ago. 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 Denies head strike or LOC. Since then noticed ecchymosis on left hip extending down past left knee. No pain at rest, but pain with certain positions/activity, including walking. She is able to partially bear weight on the LLE with a walker (denies walker use at baseline, reports she ambulates w/o assistive devices normally).  Denies numbness/tingling in the LLE. Denies any other trauma/injuries at this time.    ED Course: febrile, HR 70s-80s, SBP 120s-140s/70s-90s, sat 99 on RA , RR 18. Received oral potassium supplementation. Ativan for anxiety pre MRI.

## 2023-05-28 NOTE — H&P ADULT - PROBLEM SELECTOR PLAN 3
Home reigmen: Metoprolol succinate 25mg QD; Xarelto 20mg QD (last dose 5/27 2pm)  Chads vasc 4: age, HTN, female  Hasbled: 3 for age, htn, medications  ECG on admission: Afib     Plan  -Hold AC pending procedure  -Continue home metoprolol succinate 25mg QD Home reigmen: Metoprolol succinate 25mg QD; Xarelto 20mg QD (last dose 5/27 2pm)  Chads vasc 4: age, HTN, female  Hasbled: 3 for age, htn, medications  ECG on admission: Afib   Tachy on vitals afterwards. anxious at the time     Plan  -Hold AC pending procedure  -Continue home metoprolol succinate 25mg QD Home reigmen: Metoprolol succinate 25mg QD; Xarelto 20mg QD (last dose 5/27 2pm)  Chads vasc 4: age, HTN, female  Hasbled: 3 for age, htn, medications  ECG on admission: Afib   Tachy on vitals afterwards. anxious at the time   Repeat ECG ; pt tearful and anxious about procedure    Plan  -Hold AC pending procedure  -Continue home metoprolol succinate 25mg QD  -Lopressor 5mg IV now and repeat as necessary Home reigmen: Metoprolol succinate 25mg QD; Xarelto 20mg QD (last dose 5/27 2pm)  Chads vasc 4: age, HTN, female  Hasbled: 3 for age, htn, medications  ECG on admission: Afib   Tachy on vitals soon afterwards. anxious at the time  Repeat ECG ; pt tearful and anxious about procedure  Gave lopressor IV 5mg x1 and rate controlled to 105 on vitals    Plan  -Hold AC pending procedure  -Continue home metoprolol succinate 25mg QD  -Repeat Lopressor as necessary  -Cards consult  -CTM on tele Home reigmen: Metoprolol succinate 25mg QD; Xarelto 20mg QD (last dose 5/27 2pm)  Chads vasc 4: age, HTN, female  Hasbled: 3 for age, htn, medications  ECG on admission: Afib   Tachy on vitals soon afterwards. anxious at the time  Repeat ECG ; pt tearful and anxious about procedure  Gave lopressor IV 5mg x1 and rate controlled to 105 on vitals  High rates overall in setting of anxiety, hip fracture, and nearing 24 hours since metop succinate dosing     Plan  -Hold AC pending procedure  -Switch to  metoprolol tartrate 12.5 BID  -Repeat IV Lopressor as necessary  -Cards consult for cardiac optimization as above   -CTM on tele Home reigmen: Metoprolol succinate 25mg QD; Xarelto 20mg QD (last dose 5/27 2pm)  [id number] 4: age, HTN, female  HASBLED: 3 for age, htn, medications  ECG on admission: Afib   Pt with intermittent RVR to 130s on monitor, anxious at the time. HD stable.  Repeat ECG with Afib , pt tearful and anxious about procedure  Gave IV Lopressor 5mg x1 and rate controlled to 105 on monitor  RVR likely in setting of anxiety, pain form hip fracture, and nearing 24 hours since metoprolol succinate dosing     Plan  -Hold Xarelto for now pending procedure  -Switch to metoprolol tartrate 12.5mg BID, titrate dose as indicated  -Repeat IV Lopressor as necessary  -Cardiology consult called overnight, f/u recs  -Check TSH  -TTE ordered  -CTM on tele

## 2023-05-28 NOTE — ED PROVIDER NOTE - NSICDXPASTMEDICALHX_GEN_ALL_CORE_FT
PAST MEDICAL HISTORY:  Afib     HLD (hyperlipidemia)     HTN (hypertension)     OA (osteoarthritis)

## 2023-05-28 NOTE — ED ADULT NURSE NOTE - NSFALLHARMRISKINTERV_ED_ALL_ED
Assistance OOB with selected safe patient handling equipment if applicable/Assistance with ambulation/Communicate risk of Fall with Harm to all staff, patient, and family/Monitor gait and stability/Provide visual cue: red socks, yellow wristband, yellow gown, etc/Reinforce activity limits and safety measures with patient and family/Bed in lowest position, wheels locked, appropriate side rails in place/Call bell, personal items and telephone in reach/Instruct patient to call for assistance before getting out of bed/chair/stretcher/Non-slip footwear applied when patient is off stretcher/[place] to call system/Physically safe environment - no spills, clutter or unnecessary equipment/Purposeful Proactive Rounding/Room/bathroom lighting operational, light cord in reach

## 2023-05-28 NOTE — H&P ADULT - PROBLEM SELECTOR PLAN 4
Home regimen: Verapamil 180mg QD    Plan:  -C/W Home Verapamil 180mg QD  -CTM BPs Home regimen: Verapamil 180mg QD    Plan:  -Hold home Verapamil 180mg QD for now given pt in afib with intermittent RVR  -CTM BPs

## 2023-05-28 NOTE — ED ADULT NURSE NOTE - OBJECTIVE STATEMENT
pt A&ox4, coming to ED from home for fall on Wednesday. 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 denies hitting head, or LOC. pt on Xarelto for hx of Afib. pt was seen in urgent care and told to come to ED. past medical history: HTN, HLD, hypothyroidism. bruising noted starting from posterior left calf to left hip. pt denies Chest pain and SOB. breathing is spontaneous and unlabored. sating 99% on RA. bilateral pedal and radial pulses palpable and strong. Bed in lowest position, call bell within reach, all other safety and comfort measures provided. awaiting labs results and further orders.

## 2023-05-29 LAB
A1C WITH ESTIMATED AVERAGE GLUCOSE RESULT: 5.4 % — SIGNIFICANT CHANGE UP (ref 4–5.6)
ANION GAP SERPL CALC-SCNC: 11 MMOL/L — SIGNIFICANT CHANGE UP (ref 7–14)
ANION GAP SERPL CALC-SCNC: 12 MMOL/L — SIGNIFICANT CHANGE UP (ref 7–14)
APPEARANCE UR: ABNORMAL
APTT BLD: 34.2 SEC — SIGNIFICANT CHANGE UP (ref 27–36.3)
BACTERIA # UR AUTO: NEGATIVE — SIGNIFICANT CHANGE UP
BILIRUB UR-MCNC: NEGATIVE — SIGNIFICANT CHANGE UP
BLD GP AB SCN SERPL QL: NEGATIVE — SIGNIFICANT CHANGE UP
BUN SERPL-MCNC: 15 MG/DL — SIGNIFICANT CHANGE UP (ref 7–23)
BUN SERPL-MCNC: 17 MG/DL — SIGNIFICANT CHANGE UP (ref 7–23)
CALCIUM SERPL-MCNC: 8.7 MG/DL — SIGNIFICANT CHANGE UP (ref 8.4–10.5)
CALCIUM SERPL-MCNC: 9 MG/DL — SIGNIFICANT CHANGE UP (ref 8.4–10.5)
CHLORIDE SERPL-SCNC: 105 MMOL/L — SIGNIFICANT CHANGE UP (ref 98–107)
CHLORIDE SERPL-SCNC: 106 MMOL/L — SIGNIFICANT CHANGE UP (ref 98–107)
CHOLEST SERPL-MCNC: 165 MG/DL — SIGNIFICANT CHANGE UP
CO2 SERPL-SCNC: 20 MMOL/L — LOW (ref 22–31)
CO2 SERPL-SCNC: 22 MMOL/L — SIGNIFICANT CHANGE UP (ref 22–31)
COLOR SPEC: YELLOW — SIGNIFICANT CHANGE UP
CREAT SERPL-MCNC: 0.75 MG/DL — SIGNIFICANT CHANGE UP (ref 0.5–1.3)
CREAT SERPL-MCNC: 0.79 MG/DL — SIGNIFICANT CHANGE UP (ref 0.5–1.3)
DIFF PNL FLD: ABNORMAL
EGFR: 76 ML/MIN/1.73M2 — SIGNIFICANT CHANGE UP
EGFR: 80 ML/MIN/1.73M2 — SIGNIFICANT CHANGE UP
EPI CELLS # UR: 7 /HPF — HIGH (ref 0–5)
ESTIMATED AVERAGE GLUCOSE: 108 — SIGNIFICANT CHANGE UP
GLUCOSE BLDC GLUCOMTR-MCNC: 123 MG/DL — HIGH (ref 70–99)
GLUCOSE BLDC GLUCOMTR-MCNC: 99 MG/DL — SIGNIFICANT CHANGE UP (ref 70–99)
GLUCOSE SERPL-MCNC: 112 MG/DL — HIGH (ref 70–99)
GLUCOSE SERPL-MCNC: 118 MG/DL — HIGH (ref 70–99)
GLUCOSE UR QL: NEGATIVE — SIGNIFICANT CHANGE UP
HCT VFR BLD CALC: 31.9 % — LOW (ref 34.5–45)
HCT VFR BLD CALC: 35 % — SIGNIFICANT CHANGE UP (ref 34.5–45)
HDLC SERPL-MCNC: 48 MG/DL — LOW
HGB BLD-MCNC: 10.6 G/DL — LOW (ref 11.5–15.5)
HGB BLD-MCNC: 11.8 G/DL — SIGNIFICANT CHANGE UP (ref 11.5–15.5)
HYALINE CASTS # UR AUTO: 0 /LPF — SIGNIFICANT CHANGE UP (ref 0–7)
INR BLD: 1.34 RATIO — HIGH (ref 0.88–1.16)
KETONES UR-MCNC: ABNORMAL
LEUKOCYTE ESTERASE UR-ACNC: ABNORMAL
LIPID PNL WITH DIRECT LDL SERPL: 98 MG/DL — SIGNIFICANT CHANGE UP
MAGNESIUM SERPL-MCNC: 2 MG/DL — SIGNIFICANT CHANGE UP (ref 1.6–2.6)
MCHC RBC-ENTMCNC: 30.6 PG — SIGNIFICANT CHANGE UP (ref 27–34)
MCHC RBC-ENTMCNC: 30.6 PG — SIGNIFICANT CHANGE UP (ref 27–34)
MCHC RBC-ENTMCNC: 33.2 GM/DL — SIGNIFICANT CHANGE UP (ref 32–36)
MCHC RBC-ENTMCNC: 33.7 GM/DL — SIGNIFICANT CHANGE UP (ref 32–36)
MCV RBC AUTO: 90.7 FL — SIGNIFICANT CHANGE UP (ref 80–100)
MCV RBC AUTO: 92.2 FL — SIGNIFICANT CHANGE UP (ref 80–100)
NITRITE UR-MCNC: NEGATIVE — SIGNIFICANT CHANGE UP
NON HDL CHOLESTEROL: 117 MG/DL — SIGNIFICANT CHANGE UP
NRBC # BLD: 0 /100 WBCS — SIGNIFICANT CHANGE UP (ref 0–0)
NRBC # BLD: 0 /100 WBCS — SIGNIFICANT CHANGE UP (ref 0–0)
NRBC # FLD: 0 K/UL — SIGNIFICANT CHANGE UP (ref 0–0)
NRBC # FLD: 0 K/UL — SIGNIFICANT CHANGE UP (ref 0–0)
PH UR: 6 — SIGNIFICANT CHANGE UP (ref 5–8)
PHOSPHATE SERPL-MCNC: 2.6 MG/DL — SIGNIFICANT CHANGE UP (ref 2.5–4.5)
PLATELET # BLD AUTO: 205 K/UL — SIGNIFICANT CHANGE UP (ref 150–400)
PLATELET # BLD AUTO: 248 K/UL — SIGNIFICANT CHANGE UP (ref 150–400)
POTASSIUM SERPL-MCNC: 4.1 MMOL/L — SIGNIFICANT CHANGE UP (ref 3.5–5.3)
POTASSIUM SERPL-MCNC: 4.1 MMOL/L — SIGNIFICANT CHANGE UP (ref 3.5–5.3)
POTASSIUM SERPL-SCNC: 4.1 MMOL/L — SIGNIFICANT CHANGE UP (ref 3.5–5.3)
POTASSIUM SERPL-SCNC: 4.1 MMOL/L — SIGNIFICANT CHANGE UP (ref 3.5–5.3)
PROT UR-MCNC: ABNORMAL
PROTHROM AB SERPL-ACNC: 15.6 SEC — HIGH (ref 10.5–13.4)
RBC # BLD: 3.46 M/UL — LOW (ref 3.8–5.2)
RBC # BLD: 3.86 M/UL — SIGNIFICANT CHANGE UP (ref 3.8–5.2)
RBC # FLD: 13 % — SIGNIFICANT CHANGE UP (ref 10.3–14.5)
RBC # FLD: 13 % — SIGNIFICANT CHANGE UP (ref 10.3–14.5)
RBC CASTS # UR COMP ASSIST: 4 /HPF — SIGNIFICANT CHANGE UP (ref 0–4)
RH IG SCN BLD-IMP: POSITIVE — SIGNIFICANT CHANGE UP
SODIUM SERPL-SCNC: 137 MMOL/L — SIGNIFICANT CHANGE UP (ref 135–145)
SODIUM SERPL-SCNC: 139 MMOL/L — SIGNIFICANT CHANGE UP (ref 135–145)
SP GR SPEC: 1.03 — SIGNIFICANT CHANGE UP (ref 1.01–1.05)
T3 SERPL-MCNC: 82 NG/DL — SIGNIFICANT CHANGE UP (ref 80–200)
T4 FREE SERPL-MCNC: 1.4 NG/DL — SIGNIFICANT CHANGE UP (ref 0.9–1.8)
TRIGL SERPL-MCNC: 95 MG/DL — SIGNIFICANT CHANGE UP
TSH SERPL-MCNC: 5.32 UIU/ML — HIGH (ref 0.27–4.2)
UROBILINOGEN FLD QL: ABNORMAL
WBC # BLD: 10.01 K/UL — SIGNIFICANT CHANGE UP (ref 3.8–10.5)
WBC # BLD: 13.32 K/UL — HIGH (ref 3.8–10.5)
WBC # FLD AUTO: 10.01 K/UL — SIGNIFICANT CHANGE UP (ref 3.8–10.5)
WBC # FLD AUTO: 13.32 K/UL — HIGH (ref 3.8–10.5)
WBC UR QL: >50 /HPF — SIGNIFICANT CHANGE UP (ref 0–5)

## 2023-05-29 PROCEDURE — 99233 SBSQ HOSP IP/OBS HIGH 50: CPT | Mod: GC

## 2023-05-29 PROCEDURE — 93306 TTE W/DOPPLER COMPLETE: CPT | Mod: 26

## 2023-05-29 PROCEDURE — 99222 1ST HOSP IP/OBS MODERATE 55: CPT

## 2023-05-29 PROCEDURE — 27245 TREAT THIGH FRACTURE: CPT | Mod: LT

## 2023-05-29 DEVICE — SCREW LOKG 5X37.5MM: Type: IMPLANTABLE DEVICE | Site: LEFT | Status: FUNCTIONAL

## 2023-05-29 DEVICE — SCREW 3 LAG GAMMA  10.5 X 85 MM: Type: IMPLANTABLE DEVICE | Site: LEFT | Status: FUNCTIONAL

## 2023-05-29 DEVICE — K-WIRE STRYKER 3.2MM X 450MM: Type: IMPLANTABLE DEVICE | Site: LEFT | Status: FUNCTIONAL

## 2023-05-29 DEVICE — STRYKER TROCHANTERIC NAIL 10MM X 170MM 125 DEGREE: Type: IMPLANTABLE DEVICE | Site: LEFT | Status: FUNCTIONAL

## 2023-05-29 RX ORDER — FENTANYL CITRATE 50 UG/ML
50 INJECTION INTRAVENOUS
Refills: 0 | Status: DISCONTINUED | OUTPATIENT
Start: 2023-05-29 | End: 2023-05-29

## 2023-05-29 RX ORDER — HYDROMORPHONE HYDROCHLORIDE 2 MG/ML
0.5 INJECTION INTRAMUSCULAR; INTRAVENOUS; SUBCUTANEOUS
Refills: 0 | Status: DISCONTINUED | OUTPATIENT
Start: 2023-05-29 | End: 2023-05-29

## 2023-05-29 RX ORDER — OXYCODONE HYDROCHLORIDE 5 MG/1
5 TABLET ORAL ONCE
Refills: 0 | Status: DISCONTINUED | OUTPATIENT
Start: 2023-05-29 | End: 2023-05-29

## 2023-05-29 RX ORDER — HYDROMORPHONE HYDROCHLORIDE 2 MG/ML
0.2 INJECTION INTRAMUSCULAR; INTRAVENOUS; SUBCUTANEOUS
Refills: 0 | Status: DISCONTINUED | OUTPATIENT
Start: 2023-05-29 | End: 2023-05-29

## 2023-05-29 RX ORDER — METOPROLOL TARTRATE 50 MG
12.5 TABLET ORAL EVERY 12 HOURS
Refills: 0 | Status: DISCONTINUED | OUTPATIENT
Start: 2023-05-29 | End: 2023-06-02

## 2023-05-29 RX ORDER — CEFAZOLIN SODIUM 1 G
2000 VIAL (EA) INJECTION EVERY 8 HOURS
Refills: 0 | Status: COMPLETED | OUTPATIENT
Start: 2023-05-29 | End: 2023-05-30

## 2023-05-29 RX ORDER — ALBUMIN HUMAN 25 %
250 VIAL (ML) INTRAVENOUS ONCE
Refills: 0 | Status: COMPLETED | OUTPATIENT
Start: 2023-05-29 | End: 2023-05-29

## 2023-05-29 RX ORDER — POLYETHYLENE GLYCOL 3350 17 G/17G
17 POWDER, FOR SOLUTION ORAL DAILY
Refills: 0 | Status: DISCONTINUED | OUTPATIENT
Start: 2023-05-29 | End: 2023-06-02

## 2023-05-29 RX ORDER — SODIUM CHLORIDE 9 MG/ML
1000 INJECTION INTRAMUSCULAR; INTRAVENOUS; SUBCUTANEOUS
Refills: 0 | Status: DISCONTINUED | OUTPATIENT
Start: 2023-05-29 | End: 2023-05-31

## 2023-05-29 RX ORDER — RIVAROXABAN 15 MG-20MG
20 KIT ORAL
Refills: 0 | Status: DISCONTINUED | OUTPATIENT
Start: 2023-05-30 | End: 2023-06-02

## 2023-05-29 RX ORDER — ACETAMINOPHEN 500 MG
975 TABLET ORAL EVERY 8 HOURS
Refills: 0 | Status: DISCONTINUED | OUTPATIENT
Start: 2023-05-29 | End: 2023-06-02

## 2023-05-29 RX ORDER — SENNA PLUS 8.6 MG/1
2 TABLET ORAL AT BEDTIME
Refills: 0 | Status: DISCONTINUED | OUTPATIENT
Start: 2023-05-29 | End: 2023-06-02

## 2023-05-29 RX ORDER — OXYCODONE HYDROCHLORIDE 5 MG/1
5 TABLET ORAL EVERY 6 HOURS
Refills: 0 | Status: DISCONTINUED | OUTPATIENT
Start: 2023-05-29 | End: 2023-06-02

## 2023-05-29 RX ORDER — ONDANSETRON 8 MG/1
4 TABLET, FILM COATED ORAL ONCE
Refills: 0 | Status: DISCONTINUED | OUTPATIENT
Start: 2023-05-29 | End: 2023-05-29

## 2023-05-29 RX ORDER — OXYCODONE HYDROCHLORIDE 5 MG/1
2.5 TABLET ORAL EVERY 6 HOURS
Refills: 0 | Status: DISCONTINUED | OUTPATIENT
Start: 2023-05-29 | End: 2023-06-02

## 2023-05-29 RX ADMIN — OXYCODONE HYDROCHLORIDE 5 MILLIGRAM(S): 5 TABLET ORAL at 17:10

## 2023-05-29 RX ADMIN — Medication 75 MICROGRAM(S): at 06:10

## 2023-05-29 RX ADMIN — Medication 975 MILLIGRAM(S): at 22:07

## 2023-05-29 RX ADMIN — Medication 125 MILLILITER(S): at 15:40

## 2023-05-29 RX ADMIN — SODIUM CHLORIDE 75 MILLILITER(S): 9 INJECTION INTRAMUSCULAR; INTRAVENOUS; SUBCUTANEOUS at 16:07

## 2023-05-29 RX ADMIN — Medication 12.5 MILLIGRAM(S): at 22:10

## 2023-05-29 RX ADMIN — SIMVASTATIN 20 MILLIGRAM(S): 20 TABLET, FILM COATED ORAL at 22:05

## 2023-05-29 RX ADMIN — Medication 100 MILLIGRAM(S): at 22:08

## 2023-05-29 RX ADMIN — OXYCODONE HYDROCHLORIDE 5 MILLIGRAM(S): 5 TABLET ORAL at 17:40

## 2023-05-29 RX ADMIN — Medication 12.5 MILLIGRAM(S): at 06:10

## 2023-05-29 RX ADMIN — Medication 975 MILLIGRAM(S): at 22:37

## 2023-05-29 RX ADMIN — CHLORHEXIDINE GLUCONATE 1 APPLICATION(S): 213 SOLUTION TOPICAL at 12:04

## 2023-05-29 NOTE — PATIENT PROFILE ADULT - FALL HARM RISK - HARM RISK INTERVENTIONS
Assistance with ambulation/Assistance OOB with selected safe patient handling equipment/Communicate Risk of Fall with Harm to all staff/Discuss with provider need for PT consult/Monitor gait and stability/Provide patient with walking aids - walker, cane, crutches/Reinforce activity limits and safety measures with patient and family/Sit up slowly, dangle for a short time, stand at bedside before walking/Tailored Fall Risk Interventions/Use of alarms - bed, chair and/or voice tab/Visual Cue: Yellow wristband and red socks/Bed in lowest position, wheels locked, appropriate side rails in place/Call bell, personal items and telephone in reach/Instruct patient to call for assistance before getting out of bed or chair/Non-slip footwear when patient is out of bed/[place] to call system/Physically safe environment - no spills, clutter or unnecessary equipment/Purposeful Proactive Rounding/Room/bathroom lighting operational, light cord in reach

## 2023-05-29 NOTE — PROGRESS NOTE ADULT - PROBLEM SELECTOR PLAN 7
Hx hypothyroidism  home regimen: Synthroid 75mcg QD    Plan:  -F/U outpatient, pt is monitored closely no need for labs here  -C/W home synthroid 75mcg QD

## 2023-05-29 NOTE — PROGRESS NOTE ADULT - PROBLEM SELECTOR PLAN 4
Home regimen: Verapamil 180mg QD    Plan:  -C/W Home Verapamil 180mg QD  -CTM BPs Home regimen: Verapamil 180mg QD    Plan:  -Hold verapamil for now, BPs wnl  -CTM BPs

## 2023-05-29 NOTE — BRIEF OPERATIVE NOTE - OPERATION/FINDINGS
Problem: Occupational Therapy Goal  Goal: Occupational Therapy Goal  Goals to be met by: 5/30/18     Patient will increase functional independence with ADLs by performing:    UE Dressing with Stand-by Assistance.  LE Dressing with Stand-by Assistance.  Grooming while standing at sink with Stand-by Assistance.  Toileting from toilet with Stand-by Assistance for hygiene and clothing management.   Stand pivot transfers with Stand-by Assistance.    Outcome: Ongoing (interventions implemented as appropriate)  OT eval completed. The above goals are established to improve functional (I) and mobility.    WHIT Montes  5/6/2018  Pager: [phone]         L hip cephalomedullary nail for L greater troch fracture with IT extension No

## 2023-05-29 NOTE — PROGRESS NOTE ADULT - PROBLEM SELECTOR PLAN 9
#Prophylactic  DVT: Hold pharm agents   Diet: DASH until MN then NPO    Dispo: inpatient for possible OR    Outpatient follow up:  PCP: Dr. Osorio Watson  Cards: Tc Castillo MD  Ortho: IKE VASQUEZ MD   Pharmacy: [address] [id number] #Prophylactic  DVT: Hold pharm agents pending procedure  Diet: DASH until MN then NPO    Dispo: inpatient for possible OR    Outpatient follow up:  PCP: Dr. Osorio Watson  Cards: Tc Castillo MD  Ortho: IKE VASQUEZ MD   Pharmacy: [address] [id number]

## 2023-05-29 NOTE — PROGRESS NOTE ADULT - PROBLEM SELECTOR PLAN 8
Leuckocytosis on admission  no fevers  did feel a little cold day before admission  no chills here  CXR with clear lungs  no localizing infectious symptoms     Plan  -CTM off antibiotics

## 2023-05-29 NOTE — PROGRESS NOTE ADULT - SUBJECTIVE AND OBJECTIVE BOX
PETER LEDESMA  78y  Female      Patient is a 78y old  Female who presents with a chief complaint of Fall (29 May 2023 02:45)      INTERVAL HPI/OVERNIGHT EVENTS:      REVIEW OF SYSTEMS:  CONSTITUTIONAL: No weakness, fevers or chills  EYES/ENT: No visual changes;  No vertigo or throat pain   NECK: No pain or stiffness  RESPIRATORY: No cough, wheezing, hemoptysis; No shortness of breath  CARDIOVASCULAR: No chest pain or palpitations  GASTROINTESTINAL: No abdominal or epigastric pain. No nausea, vomiting, or hematemesis; No diarrhea or constipation. No melena or hematochezia.  GENITOURINARY: No dysuria, frequency or hematuria  NEUROLOGICAL: No numbness or weakness  SKIN: +ecchymosis expanding form L hip past L knee; Otherwise no itching, burning, rashes, or lesions   HEME: no easy bruising or unexplained bleeding  PSYCH: +anxiety re MRI and OR plan   All other review of systems is negative unless indicated above.    FAMILY HISTORY:  FH: dementia      T(C): 37.3 (05-29-23 @ 06:07), Max: 37.7 (05-28-23 @ 15:21)  HR: 97 (05-29-23 @ 06:07) (76 - 121)  BP: 109/72 (05-29-23 @ 06:07) (107/73 - 147/96)  RR: 18 (05-29-23 @ 06:07) (16 - 19)  SpO2: 98% (05-29-23 @ 06:07) (97% - 99%)  Wt(kg): --Vital Signs Last 24 Hrs  T(C): 37.3 (29 May 2023 06:07), Max: 37.7 (28 May 2023 15:21)  T(F): 99.2 (29 May 2023 06:07), Max: 99.9 (28 May 2023 15:21)  HR: 97 (29 May 2023 06:07) (76 - 121)  BP: 109/72 (29 May 2023 06:07) (107/73 - 147/96)  BP(mean): --  RR: 18 (29 May 2023 06:07) (16 - 19)  SpO2: 98% (29 May 2023 06:07) (97% - 99%)    Parameters below as of 29 May 2023 06:07  Patient On (Oxygen Delivery Method): room air      penicillin (Rash; Swelling; Pruritus)      PHYSICAL EXAM:  CONSTITUTIONAL: anxious regarding MRI and possible OR   EYES:, EOMI, No conjunctival or scleral injection, non-icteric  ENMT: Oral mucosa with moist membranes. Normal dentition; no pharyngeal injection or exudates   NECK: symmetric and without tracheal deviation   RESP: No respiratory distress, no use of accessory muscles; CTA b/l, no WRR  CV: Fast and irregular, no murmurs   GI: Soft, NT, ND, no rebound, no guarding; no palpable masses; no hepatosplenomegaly; no hernia palpated  MSK: TTP at left hip, WWP distally  SKIN: +ecchymosis expanding form L hip past L knee; otherwise no rashes or ulcers noted; no subcutaneous nodules or induration palpable  NEURO: CARRERO, ambulatory with some weight bearing, EOMI, conversational, no facial assymetry, speech clear and fluent, follows all commands   PSYCH: Appropriate insight/judgment; A+O x 3, mood and affect appropriate, recent/remote memory intact    Consultant(s) Notes Reviewed:  [x ] YES  [ ] NO  Care Discussed with Consultants/Other Providers [ x] YES  [ ] NO    LABS:      RADIOLOGY & ADDITIONAL TESTS:    Imaging Personally Reviewed:  [ ] YES  [ ] NO  acetaminophen     Tablet .. 650 milliGRAM(s) Oral every 6 hours PRN  chlorhexidine 2% Cloths 1 Application(s) Topical once  levothyroxine 75 MICROGram(s) Oral daily  melatonin 3 milliGRAM(s) Oral at bedtime PRN  metoprolol tartrate 12.5 milliGRAM(s) Oral every 12 hours  oxyCODONE    IR 2.5 milliGRAM(s) Oral every 6 hours PRN  oxyCODONE    IR 5 milliGRAM(s) Oral every 6 hours PRN  povidone iodine 5% Nasal Swab 1 Application(s) Both Nostrils once  simvastatin 20 milliGRAM(s) Oral at bedtime      HEALTH ISSUES - PROBLEM Dx:  Hip fracture, left    Preoperative clearance    Afib    HTN (hypertension)    HLD (hyperlipidemia)    Osteoarthritis    Prophylactic measure    Hypothyroid    Leukocytosis             EPTER LEDESMA  78y  Female      Patient is a 78y old  Female who presents with a chief complaint of Fall (29 May 2023 02:45)      INTERVAL HPI/OVERNIGHT EVENTS: MRI obtained showing L IT extension of hip fx. Pending OR with ortho. Pt reports pain well-controlled at rest. Denies any fevers/chills, CP, SOB, abd pain, n/v, numbness or weakness.      REVIEW OF SYSTEMS:  CONSTITUTIONAL: No weakness, fevers or chills  EYES/ENT: No visual changes;  No vertigo or throat pain   NECK: No pain or stiffness  RESPIRATORY: No cough, wheezing, hemoptysis; No shortness of breath  CARDIOVASCULAR: No chest pain or palpitations  GASTROINTESTINAL: No abdominal or epigastric pain. No nausea, vomiting, or hematemesis; No diarrhea or constipation. No melena or hematochezia.  GENITOURINARY: No dysuria, frequency or hematuria  NEUROLOGICAL: No numbness or weakness  SKIN: +ecchymosis expanding form L hip past L knee; Otherwise no itching, burning, rashes, or lesions   HEME: no easy bruising or unexplained bleeding  PSYCH: +anxiety re MRI and OR plan   All other review of systems is negative unless indicated above.    FAMILY HISTORY:  FH: dementia      T(C): 37.3 (05-29-23 @ 06:07), Max: 37.7 (05-28-23 @ 15:21)  HR: 97 (05-29-23 @ 06:07) (76 - 121)  BP: 109/72 (05-29-23 @ 06:07) (107/73 - 147/96)  RR: 18 (05-29-23 @ 06:07) (16 - 19)  SpO2: 98% (05-29-23 @ 06:07) (97% - 99%)  Wt(kg): --Vital Signs Last 24 Hrs  T(C): 37.3 (29 May 2023 06:07), Max: 37.7 (28 May 2023 15:21)  T(F): 99.2 (29 May 2023 06:07), Max: 99.9 (28 May 2023 15:21)  HR: 97 (29 May 2023 06:07) (76 - 121)  BP: 109/72 (29 May 2023 06:07) (107/73 - 147/96)  BP(mean): --  RR: 18 (29 May 2023 06:07) (16 - 19)  SpO2: 98% (29 May 2023 06:07) (97% - 99%)    Parameters below as of 29 May 2023 06:07  Patient On (Oxygen Delivery Method): room air      penicillin (Rash; Swelling; Pruritus)      PHYSICAL EXAM:  CONSTITUTIONAL: anxious regarding MRI and possible OR   EYES:, EOMI, No conjunctival or scleral injection, non-icteric  ENMT: Oral mucosa with moist membranes. Normal dentition; no pharyngeal injection or exudates   NECK: symmetric and without tracheal deviation   RESP: No respiratory distress, no use of accessory muscles; CTA b/l, no WRR  CV: Fast and irregular, no murmurs   GI: Soft, NT, ND, no rebound, no guarding; no palpable masses; no hepatosplenomegaly; no hernia palpated  MSK: TTP at left hip, WWP distally  SKIN: +ecchymosis expanding form L hip past L knee; otherwise no rashes or ulcers noted; no subcutaneous nodules or induration palpable  NEURO: CARRERO, ambulatory with some weight bearing, EOMI, conversational, no facial assymetry, speech clear and fluent, follows all commands   PSYCH: Appropriate insight/judgment; A+O x 3, mood and affect appropriate, recent/remote memory intact    Consultant(s) Notes Reviewed:  [x ] YES  [ ] NO  Care Discussed with Consultants/Other Providers [ x] YES  [ ] NO    LABS:      RADIOLOGY & ADDITIONAL TESTS:    Imaging Personally Reviewed:  [ ] YES  [ ] NO  acetaminophen     Tablet .. 650 milliGRAM(s) Oral every 6 hours PRN  chlorhexidine 2% Cloths 1 Application(s) Topical once  levothyroxine 75 MICROGram(s) Oral daily  melatonin 3 milliGRAM(s) Oral at bedtime PRN  metoprolol tartrate 12.5 milliGRAM(s) Oral every 12 hours  oxyCODONE    IR 2.5 milliGRAM(s) Oral every 6 hours PRN  oxyCODONE    IR 5 milliGRAM(s) Oral every 6 hours PRN  povidone iodine 5% Nasal Swab 1 Application(s) Both Nostrils once  simvastatin 20 milliGRAM(s) Oral at bedtime      HEALTH ISSUES - PROBLEM Dx:  Hip fracture, left    Preoperative clearance    Afib    HTN (hypertension)    HLD (hyperlipidemia)    Osteoarthritis    Prophylactic measure    Hypothyroid    Leukocytosis

## 2023-05-29 NOTE — CONSULT NOTE ADULT - ASSESSMENT
78F hx Afib (on Xarelto LD 5/27), HTN, HLD, OA (L knee; cortisone shot 2/23) c/o L hip pain s/p mechanical fall 4 days ago. Limited weight bearing. Xray with Acute displaced fracture through the left greater trochanter.  CT w/ Comminuted displaced fracture of the left femoral greater trochanter.  Cardiology consulted for known Afib on AC with rates to 130BPM and OR clearence.  Follows with Tc Castillo MD for cardiology [phone]        #Afib  - Patient with known afib on home metoprolol succinate 25mg QD and Xarelto 20mg QD (last dose 5/27 2pm)  - ECG: Afib  no ischemia  - Seen on cardiac monitor with rates as fast as 130BPM - Patient is anxious about procedure  - Continuous tele monitoring  - Received Lopressor IV 5mg x1 and rate now 90's on my visit; Repeat IV Lopressor as necessary  - Switch to metoprolol tartrate 12.5 BID  - [id number] score:  4 age, HTN, female  - HAS-BLED Score: 3 for age, htn, medications  - Would resume AC as soon as possible, perhaps heparin gtt if decision is made to hold.    - TTE in AM for cardiac for clearance requests.  - Check thyroid panel   - Monitor lytes and replete as needed.      Thank you, if any questions or clinical situation changes please call [employer] #[phone].  Attending Attestation to follow

## 2023-05-29 NOTE — PACU DISCHARGE NOTE - COMMENTS
T(C): 36.4 (05-29-23 @ 15:30), Max: 37.4 (05-28-23 @ 21:12)  HR: 80 (05-29-23 @ 17:45) (67 - 121)  BP: 123/73 (05-29-23 @ 17:45) (85/46 - 129/87)  RR: 14 (05-29-23 @ 17:45) (13 - 19)  SpO2: 97% (05-29-23 @ 17:45) (95% - 100%)    No apparent anesthesia complications. Vital signs stable, non-labored breathing with adequate oxygen saturation. Pain and nausea are controlled. All questions answered. Stable for PACU discharge and transfer to the floor.

## 2023-05-29 NOTE — CHART NOTE - NSCHARTNOTEFT_GEN_A_CORE
Echo is normal. Medically optimized from a Cardiology perspective for surgery. No further cardiac testing needed.      Discussed with orthopedic team and Cardiology attending Dr. Corona Marinelli MD  Cardiology Fellow PGY-5  Phone: [phone]    For all New Consults  [email or web address]   Login: [email or web address]

## 2023-05-29 NOTE — CONSULT NOTE ADULT - NS PANP COMMENT GEN_ALL_CORE FT
81 year old woman with history of AF on Xarelto who presents after mechanical fall with hip fracture  Called for optimization prior to OR  Patient has no signs or symptoms of ischemia or evidence of HF  Agree with checking echo to evaluate for LV function and valvular abnormalities  If echo normal, can proceed to OR without further workup

## 2023-05-29 NOTE — PROGRESS NOTE ADULT - ASSESSMENT
78F hx Afib (on Xarelto LD 5/27), HTN, HLD, OA (L knee; cortisone shot 2/23) c/o L hip pain s/p mechanical fall 4 days ago. Limited weight bearing. Xray with Acute displaced fracture through the left greater trochanter.  CT w/ Comminuted displaced fracture of the left femoral greater trochanter.       ED Course: febrile, HR 70s-80s, SBP 120s-140s/70s-90s, sat 99 on RA , RR 18. Received oral potassium supplementation. Ativan for anxiety pre MRI.

## 2023-05-29 NOTE — PROGRESS NOTE ADULT - PROBLEM SELECTOR PLAN 2
##Medical optimization  RCRI:   3.9 %  30-day risk of death, MI, or cardiac arrest  From [employer] 2017, based on pooled data from 5 high quality external validations (4 prospective). These numbers are higher than those often quoted from the now-outdated original study (Sen 1999). See Evidence for details.    If Heart Rate well controlled by morning then, no further medical workup or intervention recommended prior to procedure.   If Heart rate remains elevated would recommend further medical management prior to procedure.    Pending cardiac eval for cardiac optimization, will call in early morning when cards team available for clearance requests.    Full Risks and benefits of procedure to be discussed between surgical team and patient. ##Medical optimization  RCRI:   3.9 %  30-day risk of death, MI, or cardiac arrest  From [employer] 2017, based on pooled data from 5 high quality external validations (4 prospective). These numbers are higher than those often quoted from the now-outdated original study (Sen 1999). See Evidence for details.    Pending cardiac eval for cardiac optimization, TTE pending, please follow-up further recommendations from cardiology consult team for optimization prior to planned procedure.    Full Risks and benefits of procedure to be discussed between surgical team and patient.

## 2023-05-29 NOTE — PROGRESS NOTE ADULT - TIME BILLING
cardiac and medical clearance w/u, pending OR  Time reflective of charting, documentation and face to face evaluation

## 2023-05-29 NOTE — PROGRESS NOTE ADULT - PROBLEM SELECTOR PLAN 3
Home reigmen: Metoprolol succinate 25mg QD; Xarelto 20mg QD (last dose 5/27 2pm)  Chads vasc 4: age, HTN, female  Hasbled: 3 for age, htn, medications  ECG on admission: Afib   Tachy on vitals soon afterwards. anxious at the time  Repeat ECG ; pt tearful and anxious about procedure  Gave lopressor IV 5mg x1 and rate controlled to 105 on vitals  High rates overall in setting of anxiety, hip fracture, and nearing 24 hours since metop succinate dosing     Plan  -Hold AC pending procedure  -Switch to  metoprolol tartrate 12.5 BID  -Repeat IV Lopressor as necessary  -Cards consult for cardiac optimization as above   -CTM on tele

## 2023-05-29 NOTE — CONSULT NOTE ADULT - SUBJECTIVE AND OBJECTIVE BOX
HPI:  78F hx Afib (on Xarelto LD 5/27), HTN, HLD, OA (L knee; cortisone shot 2/23) c/o L hip pain s/p mechanical fall 4 days ago. 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 Since then noticed echymosis on Left hip etending down past knee. No pain at rest, but pain with certain positions including walking. She is  able to partially bear weight in the LLE with a walker.  Cardiology consulted for known Afib on AC with rates to 130BPM.  Pt denies LOC, chest pain, shortness of breath, numbness, tingling, headache, dizziness, lightheadedness. Follows with Tc Castillo MD for cardiology    ED Course: febrile, HR 90's-100's, SBP 120s-140s/70s-90s, sat 99 on RA , RR 18. Received oral potassium supplementation. Ativan for anxiety pre MRI. (28 May 2023 19:47)  	     Allergies    penicillin (Rash; Swelling; Pruritus)    Intolerances    	  MEDICATIONS:  metoprolol tartrate 12.5 milliGRAM(s) Oral every 12 hours  acetaminophen     Tablet .. 650 milliGRAM(s) Oral every 6 hours PRN  melatonin 3 milliGRAM(s) Oral at bedtime PRN  oxyCODONE    IR 2.5 milliGRAM(s) Oral every 6 hours PRN  oxyCODONE    IR 5 milliGRAM(s) Oral every 6 hours PRN  levothyroxine 75 MICROGram(s) Oral daily  simvastatin 20 milliGRAM(s) Oral at bedtime  chlorhexidine 2% Cloths 1 Application(s) Topical once  povidone iodine 5% Nasal Swab 1 Application(s) Both Nostrils once      PAST MEDICAL & SURGICAL HISTORY:  Afib  HTN (hypertension)  HLD (hyperlipidemia)  OA (osteoarthritis)      FAMILY HISTORY:  FH: dementia      SUBSTANCE USE  Tobacco Usage:  denies  Alcohol Usage: denies  Recreational drugs: denies      REVIEW OF SYSTEMS:  CONSTITUTIONAL: anxious regarding possible OR   EYES:, EOMI, No conjunctival or scleral injection, non-icteric  ENMT: Oral mucosa with moist membranes. Normal dentition; no pharyngeal injection or exudates  NECK: symmetric and without tracheal deviation   RESP: No respiratory distress, no use of accessory muscles; CTA b/l  CV: irregular, no murmurs   GI: Soft, NT, ND, no rebound, no guarding; no palpable masses; no hepatosplenomegaly; no hernia palpated  MSK: TTP at left hip, WWP distally  SKIN: +ecchymosis expanding form L hip past L knee  All other review of systems is negative unless indicated above.      T(C): 37.3 (05-29-23 @ 00:41), Max: 37.7 (05-28-23 @ 15:21)  HR: 105 (05-29-23 @ 00:41) (76 - 121)  BP: 115/69 (05-29-23 @ 00:41) (107/73 - 147/96)  RR: 17 (05-29-23 @ 00:41) (16 - 19)  SpO2: 97% (05-29-23 @ 00:41) (97% - 99%)  Wt(kg): --  I&O's Summary      Physical Exam:  General: NAD  Cardiovascular: Normal S1 S2, No JVD, No murmurs, No edema  Respiratory: Lungs clear to auscultation	  Gastrointestinal:  Soft, Non-tender, + BS	  Skin: warm and dry, No rashes, No ecchymoses, No cyanosis	  Extremities:  No clubbing, cyanosis or edema  Vascular: Peripheral pulses palpable 2+ bilaterally    CBC Full  -  ( 28 May 2023 14:35 )  WBC Count : 11.92 K/uL  Hemoglobin : 11.4 g/dL  Hematocrit : 33.7 %  Platelet Count - Automated : 239 K/uL  Mean Cell Volume : 87.8 fL  Mean Cell Hemoglobin : 29.7 pg  Mean Cell Hemoglobin Concentration : 33.8 gm/dL  Auto Neutrophil # : 9.08 K/uL  Auto Lymphocyte # : 1.90 K/uL  Auto Monocyte # : 0.81 K/uL  Auto Eosinophil # : 0.03 K/uL  Auto Basophil # : 0.05 K/uL  Auto Neutrophil % : 76.2 %  Auto Lymphocyte % : 15.9 %  Auto Monocyte % : 6.8 %  Auto Eosinophil % : 0.3 %  Auto Basophil % : 0.4 %    05-28    138  |  103  |  19  ----------------------------<  129<H>  3.4<L>   |  22  |  0.81    Ca    9.2      28 May 2023 14:35    TPro  6.7  /  Alb  3.9  /  TBili  1.1  /  DBili  x   /  AST  19  /  ALT  11  /  AlkPhos  94  05-28    proBNP:   Lipid Profile:   HgA1c:   TSH:           Diagnostic testing:    < from: Xray Hip w/ Pelvis 2 or 3 Views, Left (05.28.23 @ 15:10) >    FINDINGS: There is a displaced fracture through the left greater   trochanter. No other fracture or dislocation is seen in the pelvis, left   hip, and left femur. The bilateral hip joint spaces are intact. No   osseous lesion is seen.    IMPRESSION:  Acute displaced fracture through the left greater trochanter.    --- End of Report ---    < end of copied text >    < from: CT Head No Cont (05.28.23 @ 17:03) >      TECHNIQUE: Multiple axial images were obtained at 5 mm intervals from the   skull base to the vertex. Sagittal and coronal reformatted images were   obtained from the axial data set. The images were reviewed in brain and   bone windows.    COMPARISON: None available    FINDINGS: The CT examination demonstrates the ventricles, cisternal   spaces, and cortical sulci to be within normal limits. There is no   midline shift or extra axial collections. The gray white differentiation   appears within normal limits. There is no intracranial hemorrhage or   acute transcortical infarct. The bony windows demonstrates no fractures.   The visualized paranasal sinuses are within normal limits. The mastoid   air cells are well aerated.    IMPRESSION: No acute intracranial abnormality.    < end of copied text >      < from: CT Pelvis Bony Only No Cont (05.28.23 @ 17:04) >    FINDINGS:    Acute comminuted displaced fracture of the left greater trochanter. Left   femoral head remains seated within the acetabulum. Mild bilateral hip   arthrosis. Degenerative changes within the lower lumbar spine, sacroiliac   joints, pubic symphysis.    Soft tissue swelling/hematoma surrounding the fracture site.    Colonic diverticulosis. Leiomyomatous uterus. Atherosclerotic   calcifications are noted.    IMPRESSION:    Comminuted displaced fracture of the left femoral greater trochanter.   Recommend further evaluation with left hip MRI to determine if there is   extension of the fracture into the intertrochanteric region of the left   femoral neck.    --- End of Report ---    < end of copied text >

## 2023-05-29 NOTE — CHART NOTE - NSCHARTNOTEFT_GEN_A_CORE
ORTHOPEDIC POST-OPERATIVE NOTE    Patient is s/p L IMN    Subjective:  Patient reports pain at the incisional site, controlled with medication  Denies chest pain, shortness of breath, nausea, vomiting    Vital Signs Last 24 Hrs  T(C): 36.4 (29 May 2023 15:30), Max: 37.4 (28 May 2023 21:12)  T(F): 97.5 (29 May 2023 15:30), Max: 99.4 (28 May 2023 21:12)  HR: 80 (29 May 2023 17:45) (67 - 121)  BP: 123/73 (29 May 2023 17:45) (85/46 - 129/87)  BP(mean): 82 (29 May 2023 17:45) (56 - 82)  RR: 14 (29 May 2023 17:45) (13 - 19)  SpO2: 97% (29 May 2023 17:45) (95% - 100%)    Parameters below as of 29 May 2023 17:45  Patient On (Oxygen Delivery Method): room air        I&O's Detail    29 May 2023 07:01  -  29 May 2023 18:01  --------------------------------------------------------  IN:    IV PiggyBack: 250 mL    Oral Fluid: 100 mL    sodium chloride 0.9%: 150 mL  Total IN: 500 mL    OUT:  Total OUT: 0 mL    Total NET: 500 mL          Physical Exam:  General: NAD, resting comfortably in bed  Pulmonary: Nonlabored breathing, no respiratory distress  Abdominal: nondistended  Extremities:   LLE:  Incision C/D/I, + ecchymosis  No palpable hematoma or collections  NVI, SILT, +DP, WWP      LABS:                        10.6   13.32 )-----------( 205      ( 29 May 2023 16:12 )             31.9     05-29    137  |  105  |  17  ----------------------------<  118<H>  4.1   |  20<L>  |  0.75    Ca    8.7      29 May 2023 16:12  Phos  2.6     05-29  Mg     2.00     05-29    TPro  6.7  /  Alb  3.9  /  TBili  1.1  /  DBili  x   /  AST  19  /  ALT  11  /  AlkPhos  94  05-28    PT/INR - ( 29 May 2023 04:20 )   PT: 15.6 sec;   INR: 1.34 ratio         PTT - ( 29 May 2023 04:20 )  PTT:34.2 sec      Assessment:  The patient is a 78y Female who is now several hours post-op from a [facility] IMN    Plan:  - Pain control as needed  - tolerating regular diet  - DVT ppx  - OOB and ambulating as tolerated  - F/u AM labs    For all questions related to patient care, please reach out to the on-call team via the pager.     Chantal Heck, PGY 1  Orthopaedic Surgery  [facility] [id number]  [facility] [id number]  [facility] [phone]

## 2023-05-29 NOTE — PROGRESS NOTE ADULT - PROBLEM SELECTOR PLAN 1
S/p Mechanical fall; no headstrike, no LOC   Xray with Acute displaced fracture through the left greater trochanter.  CT bony pelvis w/ Comminuted displaced fracture of the left femoral greater trochanter.   CTH wnl     Plan   -MRI L hip to r/o IT extension (premedicated with ativan due to anxiety)- performed F/U Read   -F/U Ortho recs, potential OR tomorrow  -NPO@MN  -Preop Labs  -PT eval postop S/p Mechanical fall; no headstrike, no LOC   Xray with Acute displaced fracture through the left greater trochanter.  CT bony pelvis w/ Comminuted displaced fracture of the left femoral greater trochanter.   CTH wnl   MR L hip showing acute displaced left femoral greater trochanteric fracture with intertrochanteric extension of a fracture line through almost the full diameter of the intertrochanteric left femoral neck    Plan   -F/U Ortho recs, potential OR today  -NPO@MN  -Preop Labs  -PT eval postop

## 2023-05-30 LAB
ANION GAP SERPL CALC-SCNC: 7 MMOL/L — SIGNIFICANT CHANGE UP (ref 7–14)
BASOPHILS # BLD AUTO: 0.01 K/UL — SIGNIFICANT CHANGE UP (ref 0–0.2)
BASOPHILS NFR BLD AUTO: 0.1 % — SIGNIFICANT CHANGE UP (ref 0–2)
BUN SERPL-MCNC: 20 MG/DL — SIGNIFICANT CHANGE UP (ref 7–23)
CALCIUM SERPL-MCNC: 8.7 MG/DL — SIGNIFICANT CHANGE UP (ref 8.4–10.5)
CHLORIDE SERPL-SCNC: 113 MMOL/L — HIGH (ref 98–107)
CO2 SERPL-SCNC: 18 MMOL/L — LOW (ref 22–31)
CREAT SERPL-MCNC: 0.74 MG/DL — SIGNIFICANT CHANGE UP (ref 0.5–1.3)
EGFR: 82 ML/MIN/1.73M2 — SIGNIFICANT CHANGE UP
EOSINOPHIL # BLD AUTO: 0 K/UL — SIGNIFICANT CHANGE UP (ref 0–0.5)
EOSINOPHIL NFR BLD AUTO: 0 % — SIGNIFICANT CHANGE UP (ref 0–6)
GLUCOSE SERPL-MCNC: 160 MG/DL — HIGH (ref 70–99)
HCT VFR BLD CALC: 27.9 % — LOW (ref 34.5–45)
HCT VFR BLD CALC: 28.6 % — LOW (ref 34.5–45)
HGB BLD-MCNC: 9.2 G/DL — LOW (ref 11.5–15.5)
HGB BLD-MCNC: 9.4 G/DL — LOW (ref 11.5–15.5)
IANC: 8.77 K/UL — HIGH (ref 1.8–7.4)
IMM GRANULOCYTES NFR BLD AUTO: 0.6 % — SIGNIFICANT CHANGE UP (ref 0–0.9)
LYMPHOCYTES # BLD AUTO: 1.18 K/UL — SIGNIFICANT CHANGE UP (ref 1–3.3)
LYMPHOCYTES # BLD AUTO: 11.1 % — LOW (ref 13–44)
MAGNESIUM SERPL-MCNC: 2.1 MG/DL — SIGNIFICANT CHANGE UP (ref 1.6–2.6)
MCHC RBC-ENTMCNC: 30 PG — SIGNIFICANT CHANGE UP (ref 27–34)
MCHC RBC-ENTMCNC: 30.1 PG — SIGNIFICANT CHANGE UP (ref 27–34)
MCHC RBC-ENTMCNC: 32.9 GM/DL — SIGNIFICANT CHANGE UP (ref 32–36)
MCHC RBC-ENTMCNC: 33 GM/DL — SIGNIFICANT CHANGE UP (ref 32–36)
MCV RBC AUTO: 90.9 FL — SIGNIFICANT CHANGE UP (ref 80–100)
MCV RBC AUTO: 91.7 FL — SIGNIFICANT CHANGE UP (ref 80–100)
MONOCYTES # BLD AUTO: 0.59 K/UL — SIGNIFICANT CHANGE UP (ref 0–0.9)
MONOCYTES NFR BLD AUTO: 5.6 % — SIGNIFICANT CHANGE UP (ref 2–14)
NEUTROPHILS # BLD AUTO: 8.77 K/UL — HIGH (ref 1.8–7.4)
NEUTROPHILS NFR BLD AUTO: 82.6 % — HIGH (ref 43–77)
NRBC # BLD: 0 /100 WBCS — SIGNIFICANT CHANGE UP (ref 0–0)
NRBC # BLD: 0 /100 WBCS — SIGNIFICANT CHANGE UP (ref 0–0)
NRBC # FLD: 0 K/UL — SIGNIFICANT CHANGE UP (ref 0–0)
NRBC # FLD: 0 K/UL — SIGNIFICANT CHANGE UP (ref 0–0)
PHOSPHATE SERPL-MCNC: 3.1 MG/DL — SIGNIFICANT CHANGE UP (ref 2.5–4.5)
PLATELET # BLD AUTO: 203 K/UL — SIGNIFICANT CHANGE UP (ref 150–400)
PLATELET # BLD AUTO: 224 K/UL — SIGNIFICANT CHANGE UP (ref 150–400)
POTASSIUM SERPL-MCNC: 4 MMOL/L — SIGNIFICANT CHANGE UP (ref 3.5–5.3)
POTASSIUM SERPL-SCNC: 4 MMOL/L — SIGNIFICANT CHANGE UP (ref 3.5–5.3)
RBC # BLD: 3.07 M/UL — LOW (ref 3.8–5.2)
RBC # BLD: 3.12 M/UL — LOW (ref 3.8–5.2)
RBC # FLD: 12.9 % — SIGNIFICANT CHANGE UP (ref 10.3–14.5)
RBC # FLD: 13 % — SIGNIFICANT CHANGE UP (ref 10.3–14.5)
SODIUM SERPL-SCNC: 138 MMOL/L — SIGNIFICANT CHANGE UP (ref 135–145)
WBC # BLD: 10.61 K/UL — HIGH (ref 3.8–10.5)
WBC # BLD: 14.3 K/UL — HIGH (ref 3.8–10.5)
WBC # FLD AUTO: 10.61 K/UL — HIGH (ref 3.8–10.5)
WBC # FLD AUTO: 14.3 K/UL — HIGH (ref 3.8–10.5)

## 2023-05-30 PROCEDURE — 99232 SBSQ HOSP IP/OBS MODERATE 35: CPT | Mod: GC

## 2023-05-30 RX ADMIN — Medication 100 MILLIGRAM(S): at 05:41

## 2023-05-30 RX ADMIN — Medication 975 MILLIGRAM(S): at 23:12

## 2023-05-30 RX ADMIN — Medication 975 MILLIGRAM(S): at 07:38

## 2023-05-30 RX ADMIN — SIMVASTATIN 20 MILLIGRAM(S): 20 TABLET, FILM COATED ORAL at 21:53

## 2023-05-30 RX ADMIN — SODIUM CHLORIDE 75 MILLILITER(S): 9 INJECTION INTRAMUSCULAR; INTRAVENOUS; SUBCUTANEOUS at 09:09

## 2023-05-30 RX ADMIN — Medication 75 MICROGRAM(S): at 05:41

## 2023-05-30 RX ADMIN — Medication 12.5 MILLIGRAM(S): at 09:10

## 2023-05-30 RX ADMIN — RIVAROXABAN 20 MILLIGRAM(S): KIT at 17:53

## 2023-05-30 RX ADMIN — Medication 975 MILLIGRAM(S): at 14:25

## 2023-05-30 RX ADMIN — Medication 975 MILLIGRAM(S): at 23:30

## 2023-05-30 RX ADMIN — Medication 975 MILLIGRAM(S): at 07:08

## 2023-05-30 RX ADMIN — Medication 12.5 MILLIGRAM(S): at 17:53

## 2023-05-30 NOTE — PROGRESS NOTE ADULT - PROBLEM SELECTOR PLAN 7
Leuckocytosis on admission  no fevers or chills here  CXR with clear lungs  no localizing infectious symptoms     Plan  -CTM off antibiotics

## 2023-05-30 NOTE — PROGRESS NOTE ADULT - PROBLEM SELECTOR PLAN 2
Home reigmen: Metoprolol succinate 25mg QD; Xarelto 20mg QD (last dose 5/27 2pm)  Chads vasc 4: age, HTN, female  Hasbled: 3 for age, htn, medications  ECG on admission: Afib   Tachy on vitals soon afterwards. anxious at the time  Repeat ECG ; pt tearful and anxious about procedure  s/p lopressor IV 5mg x1 and rate controlled to 105 on vitals  High rates on admission overall in setting of anxiety, hip fracturedosing     Plan  -Xarelto restarted post-procedure  -C/w metoprolol tartrate 12.5 BID  -IV lopressor prn  -F/u cards recs  -CTM on tele

## 2023-05-30 NOTE — PROGRESS NOTE ADULT - PROBLEM SELECTOR PLAN 3
Home regimen: Verapamil 180mg QD and metoprolol succinate 25mg ER    Plan:  -Hold verapamil for now, BPs wnl  - c/w metop tartrate as above  -CTM BPs

## 2023-05-30 NOTE — PROGRESS NOTE ADULT - SUBJECTIVE AND OBJECTIVE BOX
PETER LEDESMA  78y  Female      Patient is a 78y old  Female who presents with a chief complaint of Fall (30 May 2023 06:28)      INTERVAL HPI/OVERNIGHT EVENTS: Pt s/p L hip IM nailing w/ ortho yesterday 5/29.      REVIEW OF SYSTEMS:  CONSTITUTIONAL: No fever, weight loss, or fatigue  EYES: No eye pain, visual disturbances, or discharge  ENMT:  No difficulty hearing, tinnitus, vertigo; No sinus or throat pain  NECK: No pain or stiffness  BREASTS: No pain, masses, or nipple discharge  RESPIRATORY: No cough, wheezing, chills or hemoptysis; No shortness of breath  CARDIOVASCULAR: No chest pain, palpitations, dizziness, or leg swelling  GASTROINTESTINAL: No abdominal or epigastric pain. No nausea, vomiting, or hematemesis; No diarrhea or constipation. No melena or hematochezia.  GENITOURINARY: No dysuria, frequency, hematuria, or incontinence  NEUROLOGICAL: No headaches, memory loss, loss of strength, numbness, or tremors  SKIN: No itching, burning, rashes, or lesions   LYMPH NODES: No enlarged glands  ENDOCRINE: No heat or cold intolerance; No hair loss  MUSCULOSKELETAL: No joint pain or swelling; No muscle, back, or extremity pain  PSYCHIATRIC: No depression, anxiety, mood swings, or difficulty sleeping  HEME/LYMPH: No easy bruising, or bleeding gums  ALLERY AND IMMUNOLOGIC: No hives or eczema  FAMILY HISTORY:  FH: dementia      T(C): 36.8 (05-30-23 @ 05:03), Max: 37.3 (05-29-23 @ 11:41)  HR: 90 (05-30-23 @ 05:03) (67 - 96)  BP: 121/68 (05-30-23 @ 05:03) (85/46 - 126/77)  RR: 18 (05-30-23 @ 05:03) (13 - 19)  SpO2: 96% (05-30-23 @ 05:03) (95% - 100%)  Wt(kg): --Vital Signs Last 24 Hrs  T(C): 36.8 (30 May 2023 05:03), Max: 37.3 (29 May 2023 11:41)  T(F): 98.3 (30 May 2023 05:03), Max: 99.1 (29 May 2023 11:41)  HR: 90 (30 May 2023 05:03) (67 - 96)  BP: 121/68 (30 May 2023 05:03) (85/46 - 126/77)  BP(mean): 82 (29 May 2023 17:45) (56 - 82)  RR: 18 (30 May 2023 05:03) (13 - 19)  SpO2: 96% (30 May 2023 05:03) (95% - 100%)    Parameters below as of 30 May 2023 05:03  Patient On (Oxygen Delivery Method): room air      penicillin (Rash; Swelling; Pruritus)      PHYSICAL EXAM:  GENERAL: NAD, well-groomed, well-developed  HEAD:  Atraumatic, Normocephalic  EYES: EOMI, PERRLA, conjunctiva and sclera clear  ENMT: No tonsillar erythema, exudates, or enlargement; Moist mucous membranes, Good dentition, No lesions  NECK: Supple, No JVD, Normal thyroid  NERVOUS SYSTEM:  Alert & Oriented X3, Good concentration; Motor Strength 5/5 B/L upper and lower extremities; DTRs 2+ intact and symmetric  CHEST/LUNG: Clear to percussion bilaterally; No rales, rhonchi, wheezing, or rubs  HEART: Regular rate and rhythm; No murmurs, rubs, or gallops  ABDOMEN: Soft, Nontender, Nondistended; Bowel sounds present  EXTREMITIES:  2+ Peripheral Pulses, No clubbing, cyanosis, or edema  LYMPH: No lymphadenopathy noted  SKIN: Ecchymosis over medial and lateral L thigh unchanged from prior, no increase in swelling from prior, no rashes    Consultant(s) Notes Reviewed:  [x ] YES  [ ] NO  Care Discussed with Consultants/Other Providers [ x] YES  [ ] NO    LABS:      RADIOLOGY & ADDITIONAL TESTS:    Imaging Personally Reviewed:  [ ] YES  [ ] NO  acetaminophen     Tablet .. 975 milliGRAM(s) Oral every 8 hours  levothyroxine 75 MICROGram(s) Oral daily  melatonin 3 milliGRAM(s) Oral at bedtime PRN  metoprolol tartrate 12.5 milliGRAM(s) Oral every 12 hours  oxyCODONE    IR 5 milliGRAM(s) Oral every 6 hours PRN  oxyCODONE    IR 2.5 milliGRAM(s) Oral every 6 hours PRN  polyethylene glycol 3350 17 Gram(s) Oral daily  povidone iodine 5% Nasal Swab 1 Application(s) Both Nostrils once  rivaroxaban 20 milliGRAM(s) Oral with dinner  senna 2 Tablet(s) Oral at bedtime  simvastatin 20 milliGRAM(s) Oral at bedtime  sodium chloride 0.9%. 1000 milliLiter(s) IV Continuous <Continuous>      HEALTH ISSUES - PROBLEM Dx:  Hip fracture, left    Preoperative clearance    Afib    HTN (hypertension)    HLD (hyperlipidemia)    Osteoarthritis    Hypothyroid    Leukocytosis    Prophylactic measure

## 2023-05-30 NOTE — PROGRESS NOTE ADULT - PROBLEM SELECTOR PLAN 8
#Prophylactic  DVT: Xarelto  Diet: Regular    Dispo: Pending PT recs    Outpatient follow up:  PCP: Dr. Osorio Watson  Cards: Tc Castillo MD  Ortho: IKE VASQUEZ MD   Pharmacy: [facility] [address] [place] [address]

## 2023-05-30 NOTE — PROGRESS NOTE ADULT - SUBJECTIVE AND OBJECTIVE BOX
Orthopaedic Surgery Progress Note    Subjective:   Patient seen and examined. No acute events overnight. Already up and walking in room with walker.     Objective:  T(C): 36.8 (05-30-23 @ 05:03), Max: 37.3 (05-29-23 @ 11:41)  HR: 90 (05-30-23 @ 05:03) (67 - 96)  BP: 121/68 (05-30-23 @ 05:03) (85/46 - 126/77)  RR: 18 (05-30-23 @ 05:03) (13 - 19)  SpO2: 96% (05-30-23 @ 05:03) (95% - 100%)  Wt(kg): --    05-29 @ 07:01  -  05-30 @ 06:29  --------------------------------------------------------  IN: 965 mL / OUT: 0 mL / NET: 965 mL        PE    NAD  LLE:   dressing serosanguinous, changed  ecchymosis over medial and lateral thigh unchanged from prior, no increase in swelling from prior  motor intact GS/TA/EHL  SILT S/S/SP/DP  WWP                          10.6   13.32 )-----------( 205      ( 29 May 2023 16:12 )             31.9         78y Female w/ L GT fx with IT extension s/p IMN 5/29  - Pain control  - WBAT  - Staples to be removed POD14  - PT/OT/OOB  - DVT ppx: Xarelto 20mg to be resumed today  - Dispo planning pending PT eval

## 2023-05-30 NOTE — PHYSICAL THERAPY INITIAL EVALUATION ADULT - LIVES WITH, PROFILE
private home, 3 steps in with one handrail, bedroom on first level; there are steps inside but does not have to use/alone

## 2023-05-30 NOTE — PHYSICAL THERAPY INITIAL EVALUATION ADULT - LEVEL OF INDEPENDENCE: SIT/STAND, REHAB EVAL
Wife called the refill line and would like this script called into [facility]    amoxicillin (AMOXIL) 500 MG tablet  Take 4 tabs (2 grams) orally once 30 - 60 minutes before dental procedure   supervision

## 2023-05-30 NOTE — PROGRESS NOTE ADULT - SUBJECTIVE AND OBJECTIVE BOX
ANESTHESIA POSTOP CHECK    78y Female POSTOP DAY 1 S/P     Vital Signs Last 24 Hrs  T(C): 36.8 (30 May 2023 09:07), Max: 37.3 (29 May 2023 11:41)  T(F): 98.2 (30 May 2023 09:07), Max: 99.1 (29 May 2023 11:41)  HR: 96 (30 May 2023 09:07) (67 - 96)  BP: 136/70 (30 May 2023 09:07) (85/46 - 136/70)  BP(mean): 82 (29 May 2023 17:45) (56 - 82)  RR: 18 (30 May 2023 09:07) (13 - 19)  SpO2: 96% (30 May 2023 09:07) (95% - 100%)    Parameters below as of 30 May 2023 09:07  Patient On (Oxygen Delivery Method): room air      I&O's Summary    29 May 2023 07:01  -  30 May 2023 07:00  --------------------------------------------------------  IN: 1805 mL / OUT: 500 mL / NET: 1305 mL    30 May 2023 07:01  -  30 May 2023 11:27  --------------------------------------------------------  IN: 0 mL / OUT: 250 mL / NET: -250 mL        [x ] NO APPARENT ANESTHESIA COMPLICATIONS      Comments:

## 2023-05-30 NOTE — OCCUPATIONAL THERAPY INITIAL EVALUATION ADULT - PERTINENT HX OF CURRENT PROBLEM, REHAB EVAL
Pt is an 77 y/o F with PMH of Afib (on Xarelto LD 5/27), HTN, HLD, OA (L knee; cortisone shot 2/23) c/o L hip pain s/p mechanical fall 5/25. Limited weight bearing. Xray with acute displaced fracture through the left greater trochanter. CT w/ comminuted displaced fracture of the left femoral greater trochanter. Pt is now s/p left hip IMN.

## 2023-05-30 NOTE — PROGRESS NOTE ADULT - PROBLEM SELECTOR PLAN 1
S/p Mechanical fall; no headstrike, no LOC   Xray with Acute displaced fracture through the left greater trochanter.  CT bony pelvis w/ Comminuted displaced fracture of the left femoral greater trochanter.   CTH wnl   MR L hip showing acute displaced left femoral greater trochanteric fracture with intertrochanteric extension of a fracture line through almost the full diameter of the intertrochanteric left femoral neck    Plan   -s/p L hip IM nailing w/ ortho 5/29  -WBAT  -PT eval postop

## 2023-05-30 NOTE — PROGRESS NOTE ADULT - PROBLEM SELECTOR PLAN 4
Home regimen: Simvastatin 20mg qhS    Plan:  -F/U outpatient, pt is monitored closely no need for labs here  -C/W Simvastatin 20mg qhS

## 2023-05-31 LAB
ALBUMIN SERPL ELPH-MCNC: 2.9 G/DL — LOW (ref 3.3–5)
ALP SERPL-CCNC: 69 U/L — SIGNIFICANT CHANGE UP (ref 40–120)
ALT FLD-CCNC: 11 U/L — SIGNIFICANT CHANGE UP (ref 4–33)
ANION GAP SERPL CALC-SCNC: 11 MMOL/L — SIGNIFICANT CHANGE UP (ref 7–14)
AST SERPL-CCNC: 17 U/L — SIGNIFICANT CHANGE UP (ref 4–32)
BASOPHILS # BLD AUTO: 0.01 K/UL — SIGNIFICANT CHANGE UP (ref 0–0.2)
BASOPHILS NFR BLD AUTO: 0.1 % — SIGNIFICANT CHANGE UP (ref 0–2)
BILIRUB SERPL-MCNC: 0.7 MG/DL — SIGNIFICANT CHANGE UP (ref 0.2–1.2)
BUN SERPL-MCNC: 16 MG/DL — SIGNIFICANT CHANGE UP (ref 7–23)
CALCIUM SERPL-MCNC: 8.3 MG/DL — LOW (ref 8.4–10.5)
CHLORIDE SERPL-SCNC: 113 MMOL/L — HIGH (ref 98–107)
CO2 SERPL-SCNC: 20 MMOL/L — LOW (ref 22–31)
CREAT SERPL-MCNC: 0.71 MG/DL — SIGNIFICANT CHANGE UP (ref 0.5–1.3)
EGFR: 86 ML/MIN/1.73M2 — SIGNIFICANT CHANGE UP
EOSINOPHIL # BLD AUTO: 0.06 K/UL — SIGNIFICANT CHANGE UP (ref 0–0.5)
EOSINOPHIL NFR BLD AUTO: 0.6 % — SIGNIFICANT CHANGE UP (ref 0–6)
GLUCOSE SERPL-MCNC: 95 MG/DL — SIGNIFICANT CHANGE UP (ref 70–99)
HCT VFR BLD CALC: 26.8 % — LOW (ref 34.5–45)
HGB BLD-MCNC: 8.6 G/DL — LOW (ref 11.5–15.5)
IANC: 6.44 K/UL — SIGNIFICANT CHANGE UP (ref 1.8–7.4)
IMM GRANULOCYTES NFR BLD AUTO: 0.8 % — SIGNIFICANT CHANGE UP (ref 0–0.9)
LYMPHOCYTES # BLD AUTO: 2.49 K/UL — SIGNIFICANT CHANGE UP (ref 1–3.3)
LYMPHOCYTES # BLD AUTO: 25.4 % — SIGNIFICANT CHANGE UP (ref 13–44)
MAGNESIUM SERPL-MCNC: 2 MG/DL — SIGNIFICANT CHANGE UP (ref 1.6–2.6)
MCHC RBC-ENTMCNC: 29.2 PG — SIGNIFICANT CHANGE UP (ref 27–34)
MCHC RBC-ENTMCNC: 32.1 GM/DL — SIGNIFICANT CHANGE UP (ref 32–36)
MCV RBC AUTO: 90.8 FL — SIGNIFICANT CHANGE UP (ref 80–100)
MONOCYTES # BLD AUTO: 0.71 K/UL — SIGNIFICANT CHANGE UP (ref 0–0.9)
MONOCYTES NFR BLD AUTO: 7.3 % — SIGNIFICANT CHANGE UP (ref 2–14)
NEUTROPHILS # BLD AUTO: 6.44 K/UL — SIGNIFICANT CHANGE UP (ref 1.8–7.4)
NEUTROPHILS NFR BLD AUTO: 65.8 % — SIGNIFICANT CHANGE UP (ref 43–77)
NRBC # BLD: 0 /100 WBCS — SIGNIFICANT CHANGE UP (ref 0–0)
NRBC # FLD: 0 K/UL — SIGNIFICANT CHANGE UP (ref 0–0)
PHOSPHATE SERPL-MCNC: 2.1 MG/DL — LOW (ref 2.5–4.5)
PLATELET # BLD AUTO: 221 K/UL — SIGNIFICANT CHANGE UP (ref 150–400)
POTASSIUM SERPL-MCNC: 3.8 MMOL/L — SIGNIFICANT CHANGE UP (ref 3.5–5.3)
POTASSIUM SERPL-SCNC: 3.8 MMOL/L — SIGNIFICANT CHANGE UP (ref 3.5–5.3)
PROT SERPL-MCNC: 5.1 G/DL — LOW (ref 6–8.3)
RBC # BLD: 2.95 M/UL — LOW (ref 3.8–5.2)
RBC # FLD: 13.2 % — SIGNIFICANT CHANGE UP (ref 10.3–14.5)
SODIUM SERPL-SCNC: 144 MMOL/L — SIGNIFICANT CHANGE UP (ref 135–145)
WBC # BLD: 9.79 K/UL — SIGNIFICANT CHANGE UP (ref 3.8–10.5)
WBC # FLD AUTO: 9.79 K/UL — SIGNIFICANT CHANGE UP (ref 3.8–10.5)

## 2023-05-31 PROCEDURE — 99232 SBSQ HOSP IP/OBS MODERATE 35: CPT | Mod: GC

## 2023-05-31 RX ORDER — SODIUM,POTASSIUM PHOSPHATES 278-250MG
1 POWDER IN PACKET (EA) ORAL
Refills: 0 | Status: COMPLETED | OUTPATIENT
Start: 2023-05-31 | End: 2023-06-01

## 2023-05-31 RX ADMIN — Medication 975 MILLIGRAM(S): at 07:31

## 2023-05-31 RX ADMIN — Medication 975 MILLIGRAM(S): at 15:29

## 2023-05-31 RX ADMIN — SIMVASTATIN 20 MILLIGRAM(S): 20 TABLET, FILM COATED ORAL at 22:38

## 2023-05-31 RX ADMIN — Medication 975 MILLIGRAM(S): at 07:01

## 2023-05-31 RX ADMIN — Medication 75 MICROGRAM(S): at 05:38

## 2023-05-31 RX ADMIN — Medication 12.5 MILLIGRAM(S): at 05:38

## 2023-05-31 RX ADMIN — Medication 12.5 MILLIGRAM(S): at 15:30

## 2023-05-31 RX ADMIN — Medication 1 TABLET(S): at 15:31

## 2023-05-31 RX ADMIN — RIVAROXABAN 20 MILLIGRAM(S): KIT at 15:32

## 2023-05-31 RX ADMIN — Medication 975 MILLIGRAM(S): at 15:46

## 2023-05-31 NOTE — PROGRESS NOTE ADULT - SUBJECTIVE AND OBJECTIVE BOX
PETER LEDESMA  78y  Female      Patient is a 78y old  Female who presents with a chief complaint of Fall (30 May 2023 11:26)      INTERVAL HPI/OVERNIGHT EVENTS: No acute events overnight. Pt denies any pain or complaints this AM. Notes dressing was changed by ortho this morning. Notes she was able to walk up and down 4 steps with PT yesterday. No fevers/chills, CP, SOB, abd pain.      REVIEW OF SYSTEMS:  Constitutional: no fever and no chills  Eyes: no discharge, no pain, no visual changes  ENMT: no ear pain, no dysphagia or throat pain  Neck: no pain, no stiffness  CV: no chest pain, no palpitations, no edema  Resp: no cough, no shortness of breath  Abd: no abdominal pain, no nausea or vomiting, no diarrhea  : no dysuria, no hematuria  MSK: no back pain, no neck pain, no joint pain  Neuro: no LOC, no gait abnormality, no headache, no sensory deficits, no weakness  Skin: LLE bruising, no rashes, no lacerations    FAMILY HISTORY:  FH: dementia      T(C): 36.6 (05-31-23 @ 04:00), Max: 37 (05-30-23 @ 16:41)  HR: 94 (05-31-23 @ 04:00) (81 - 99)  BP: 152/75 (05-31-23 @ 04:00) (121/74 - 152/75)  RR: 18 (05-31-23 @ 04:00) (18 - 18)  SpO2: 97% (05-31-23 @ 04:00) (96% - 100%)  Wt(kg): --Vital Signs Last 24 Hrs  T(C): 36.6 (31 May 2023 04:00), Max: 37 (30 May 2023 16:41)  T(F): 97.8 (31 May 2023 04:00), Max: 98.6 (30 May 2023 16:41)  HR: 94 (31 May 2023 04:00) (81 - 99)  BP: 152/75 (31 May 2023 04:00) (121/74 - 152/75)  BP(mean): --  RR: 18 (31 May 2023 04:00) (18 - 18)  SpO2: 97% (31 May 2023 04:00) (96% - 100%)    Parameters below as of 31 May 2023 04:00  Patient On (Oxygen Delivery Method): room air      penicillin (Rash; Swelling; Pruritus)      PHYSICAL EXAM:  GENERAL: NAD, well-groomed, well-developed  HEAD:  Atraumatic, Normocephalic  EYES: EOMI, PERRLA, conjunctiva and sclera clear  ENMT: No tonsillar erythema, exudates, or enlargement; Moist mucous membranes, Good dentition, No lesions  NECK: Supple, No JVD, Normal thyroid  NERVOUS SYSTEM:  Alert & Oriented X3, Good concentration; Motor Strength 5/5 B/L upper and lower extremities; DTRs 2+ intact and symmetric  CHEST/LUNG: Clear to percussion bilaterally; No rales, rhonchi, wheezing, or rubs  HEART: Regular rate and rhythm; No murmurs, rubs, or gallops  ABDOMEN: Soft, Nontender, Nondistended; Bowel sounds present  EXTREMITIES:  2+ Peripheral Pulses, No clubbing, cyanosis, or edema  LYMPH: No lymphadenopathy noted  SKIN: Ecchymosis over medial and lateral L thigh unchanged from prior, no increase in swelling from prior, no rashes    Consultant(s) Notes Reviewed:  [x ] YES  [ ] NO  Care Discussed with Consultants/Other Providers [ x] YES  [ ] NO    LABS:      RADIOLOGY & ADDITIONAL TESTS:    Imaging Personally Reviewed:  [ ] YES  [ ] NO  acetaminophen     Tablet .. 975 milliGRAM(s) Oral every 8 hours  bisacodyl Suppository 10 milliGRAM(s) Rectal daily PRN  levothyroxine 75 MICROGram(s) Oral daily  melatonin 3 milliGRAM(s) Oral at bedtime PRN  metoprolol tartrate 12.5 milliGRAM(s) Oral every 12 hours  oxyCODONE    IR 5 milliGRAM(s) Oral every 6 hours PRN  oxyCODONE    IR 2.5 milliGRAM(s) Oral every 6 hours PRN  polyethylene glycol 3350 17 Gram(s) Oral daily  povidone iodine 5% Nasal Swab 1 Application(s) Both Nostrils once  rivaroxaban 20 milliGRAM(s) Oral with dinner  senna 2 Tablet(s) Oral at bedtime  simvastatin 20 milliGRAM(s) Oral at bedtime      HEALTH ISSUES - PROBLEM Dx:  Hip fracture, left    Afib    HTN (hypertension)    HLD (hyperlipidemia)    Osteoarthritis    Hypothyroid    Leukocytosis    Prophylactic measure             PETER LEDESMA  78y  Female      Patient is a 78y old  Female who presents with a chief complaint of Fall (30 May 2023 11:26)      INTERVAL HPI/OVERNIGHT EVENTS: No acute events overnight. Pt denies any pain or complaints this AM. Notes dressing was changed by ortho this morning. Notes she was able to walk up and down 4 steps with PT yesterday. No fevers/chills, CP, SOB, abd pain.      REVIEW OF SYSTEMS:  Constitutional: no fever and no chills  Eyes: no discharge, no pain, no visual changes  ENMT: no ear pain, no dysphagia or throat pain  Neck: no pain, no stiffness  CV: no chest pain, no palpitations, no edema  Resp: no cough, no shortness of breath  Abd: no abdominal pain, no nausea or vomiting, no diarrhea  : no dysuria, no hematuria  MSK: no back pain, no neck pain, no joint pain  Neuro: no LOC, no gait abnormality, no headache, no sensory deficits, no weakness  Skin: LLE bruising, no rashes, no lacerations    FAMILY HISTORY:  FH: dementia      T(C): 36.6 (05-31-23 @ 04:00), Max: 37 (05-30-23 @ 16:41)  HR: 94 (05-31-23 @ 04:00) (81 - 99)  BP: 152/75 (05-31-23 @ 04:00) (121/74 - 152/75)  RR: 18 (05-31-23 @ 04:00) (18 - 18)  SpO2: 97% (05-31-23 @ 04:00) (96% - 100%)  Wt(kg): --Vital Signs Last 24 Hrs  T(C): 36.6 (31 May 2023 04:00), Max: 37 (30 May 2023 16:41)  T(F): 97.8 (31 May 2023 04:00), Max: 98.6 (30 May 2023 16:41)  HR: 94 (31 May 2023 04:00) (81 - 99)  BP: 152/75 (31 May 2023 04:00) (121/74 - 152/75)  BP(mean): --  RR: 18 (31 May 2023 04:00) (18 - 18)  SpO2: 97% (31 May 2023 04:00) (96% - 100%)    Parameters below as of 31 May 2023 04:00  Patient On (Oxygen Delivery Method): room air      penicillin (Rash; Swelling; Pruritus)      PHYSICAL EXAM:  GENERAL: NAD, well-groomed, well-developed  HEAD:  Atraumatic, Normocephalic  EYES: EOMI, PERRLA, conjunctiva and sclera clear  ENMT: No tonsillar erythema, exudates, or enlargement; Moist mucous membranes, Good dentition, No lesions  NECK: Supple, No JVD, Normal thyroid  NERVOUS SYSTEM:  Alert & Oriented X3, Good concentration; Motor Strength 5/5 B/L upper and lower extremities; DTRs 2+ intact and symmetric  CHEST/LUNG: Clear to percussion bilaterally; No rales, rhonchi, wheezing, or rubs  HEART: Regular rate and rhythm; No murmurs, rubs, or gallops  ABDOMEN: Soft, Nontender, Nondistended; Bowel sounds present  EXTREMITIES:  2+ Peripheral Pulses, No clubbing, cyanosis, or edema  LYMPH: No lymphadenopathy noted  SKIN: LLE incision sites w/ dressing C/D/I, ecchymosis and swelling over medial and lateral L thigh improving from prior, no rashes    Consultant(s) Notes Reviewed:  [x ] YES  [ ] NO  Care Discussed with Consultants/Other Providers [ x] YES  [ ] NO    LABS:      RADIOLOGY & ADDITIONAL TESTS:    Imaging Personally Reviewed:  [ ] YES  [ ] NO  acetaminophen     Tablet .. 975 milliGRAM(s) Oral every 8 hours  bisacodyl Suppository 10 milliGRAM(s) Rectal daily PRN  levothyroxine 75 MICROGram(s) Oral daily  melatonin 3 milliGRAM(s) Oral at bedtime PRN  metoprolol tartrate 12.5 milliGRAM(s) Oral every 12 hours  oxyCODONE    IR 5 milliGRAM(s) Oral every 6 hours PRN  oxyCODONE    IR 2.5 milliGRAM(s) Oral every 6 hours PRN  polyethylene glycol 3350 17 Gram(s) Oral daily  povidone iodine 5% Nasal Swab 1 Application(s) Both Nostrils once  rivaroxaban 20 milliGRAM(s) Oral with dinner  senna 2 Tablet(s) Oral at bedtime  simvastatin 20 milliGRAM(s) Oral at bedtime      HEALTH ISSUES - PROBLEM Dx:  Hip fracture, left    Afib    HTN (hypertension)    HLD (hyperlipidemia)    Osteoarthritis    Hypothyroid    Leukocytosis    Prophylactic measure

## 2023-05-31 NOTE — PROGRESS NOTE ADULT - PROBLEM SELECTOR PLAN 2
Home reigmen: Metoprolol succinate 25mg QD; Xarelto 20mg QD (last dose 5/27 2pm)  Chads vasc 4: age, HTN, female  Hasbled: 3 for age, htn, medications  ECG on admission: Afib   Tachy on vitals soon afterwards. anxious at the time  Repeat ECG ; pt tearful and anxious about procedure  s/p lopressor IV 5mg x1 and rate controlled to 105 on vitals  High rates on admission overall in setting of anxiety, hip fracturedosing     Plan  -Xarelto restarted post-procedure  -C/w metoprolol tartrate 12.5 BID  -IV lopressor prn  -F/u cards recs  -CTM on tele Home reigmen: Metoprolol succinate 25mg QD; Xarelto 20mg QD (last dose 5/27 2pm)  Chads vasc 4: age, HTN, female  Hasbled: 3 for age, htn, medications  ECG on admission: Afib   Pt reporting persistent anxiety during admission as this is her first time in the hospital  Tele reviewed 5/31 showing AF w/ HR 90s-low 100s    Plan  -C/w Xarelto  -C/w metoprolol tartrate 12.5 BID  -IV lopressor prn  -F/u cards recs  -CTM on tele

## 2023-05-31 NOTE — PROGRESS NOTE ADULT - ASSESSMENT
78F hx Afib (on Xarelto LD 5/27), HTN, HLD, OA (L knee; cortisone shot 2/23) c/o L hip pain s/p mechanical fall 4 days ago. Limited weight bearing. Xray with Acute displaced fracture through the left greater trochanter.  CT w/ Comminuted displaced fracture of the left femoral greater trochanter.       ED Course: febrile, HR 70s-80s, SBP 120s-140s/70s-90s, sat 99 on RA , RR 18. Received oral potassium supplementation. Ativan for anxiety pre MRI.   78F hx Afib (on Xarelto LD 5/27), HTN, HLD, OA (L knee; cortisone shot 2/23) c/o L hip pain s/p mechanical fall 4 days ago. Limited weight bearing. Xray with Acute displaced fracture through the left greater trochanter.  CT w/ Comminuted displaced fracture of the left femoral greater trochanter s/p L hip IM nailing w/ ortho 5/29.

## 2023-05-31 NOTE — PROGRESS NOTE ADULT - PROBLEM SELECTOR PLAN 8
#Prophylactic  DVT: Xarelto  Diet: Regular    Dispo: Pending PT recs    Outpatient follow up:  PCP: Dr. Osorio Watson  Cards: Tc Castillo MD  Ortho: IKE VASQUEZ MD   Pharmacy: [facility] [address] [place] [address] #Prophylactic  DVT: Xarelto  Diet: Regular    Dispo: Home PT    Outpatient follow up:  PCP: Dr. Osorio Watson  Cards: Tc Castillo MD  Ortho: IKE VASQUEZ MD   Pharmacy: [facility] [address] [place] [address]

## 2023-05-31 NOTE — PROGRESS NOTE ADULT - PROBLEM SELECTOR PLAN 1
S/p Mechanical fall; no headstrike, no LOC   Xray with Acute displaced fracture through the left greater trochanter.  CT bony pelvis w/ Comminuted displaced fracture of the left femoral greater trochanter.   CTH wnl   MR L hip showing acute displaced left femoral greater trochanteric fracture with intertrochanteric extension of a fracture line through almost the full diameter of the intertrochanteric left femoral neck    Plan   -s/p L hip IM nailing w/ ortho 5/29  -WBAT  -PT eval postop S/p Mechanical fall; no headstrike, no LOC   Xray with Acute displaced fracture through the left greater trochanter.  CT bony pelvis w/ Comminuted displaced fracture of the left femoral greater trochanter.   CTH wnl   MR L hip showing acute displaced left femoral greater trochanteric fracture with intertrochanteric extension of a fracture line through almost the full diameter of the intertrochanteric left femoral neck    Plan   -s/p L hip IM nailing w/ ortho 5/29  -WBAT  -PT eval rec home PT

## 2023-05-31 NOTE — PROGRESS NOTE ADULT - SUBJECTIVE AND OBJECTIVE BOX
Orthopaedic Surgery Progress Note    Subjective:   Patient seen and examined. No acute events overnight. Walking up and down the hallway with PT.     Objective:  Vital Signs Last 24 Hrs  T(C): 36.6 (31 May 2023 04:00), Max: 37 (30 May 2023 16:41)  T(F): 97.8 (31 May 2023 04:00), Max: 98.6 (30 May 2023 16:41)  HR: 94 (31 May 2023 04:00) (81 - 99)  BP: 152/75 (31 May 2023 04:00) (121/74 - 152/75)  BP(mean): --  RR: 18 (31 May 2023 04:00) (18 - 18)  SpO2: 97% (31 May 2023 04:00) (96% - 100%)    Parameters below as of 31 May 2023 04:00  Patient On (Oxygen Delivery Method): room air      PE    NAD  LLE:   dressing serosanguinous, changed  ecchymosis over medial and lateral thigh unchanged from prior, no increase in swelling from prior  motor intact GS/TA/EHL  SILT S/S/SP/DP  WWP                                       8.6    9.79  )-----------( 221      ( 31 May 2023 06:45 )             26.8         78y Female w/ L GT fx with IT extension s/p IMN 5/29  - Pain control  - WBAT  - Pressure dressing, change PRN for saturation  - Staples to be removed POD14  - PT/OT/OOB  - DVT ppx: Xarelto 20mg  - Dispo planning: home

## 2023-05-31 NOTE — PROGRESS NOTE ADULT - PROBLEM SELECTOR PLAN 7
Leuckocytosis on admission  no fevers or chills here  CXR with clear lungs  no localizing infectious symptoms     Plan  -CTM off antibiotics Leuckocytosis on admission  no fevers or chills here  CXR with clear lungs  no localizing infectious symptoms   Now resolved    Plan  -CTM off antibiotics

## 2023-06-01 ENCOUNTER — TRANSCRIPTION ENCOUNTER (OUTPATIENT)
Age: 78
End: 2023-06-01

## 2023-06-01 LAB
ANION GAP SERPL CALC-SCNC: 11 MMOL/L — SIGNIFICANT CHANGE UP (ref 7–14)
BLD GP AB SCN SERPL QL: NEGATIVE — SIGNIFICANT CHANGE UP
BUN SERPL-MCNC: 13 MG/DL — SIGNIFICANT CHANGE UP (ref 7–23)
CALCIUM SERPL-MCNC: 8.2 MG/DL — LOW (ref 8.4–10.5)
CHLORIDE SERPL-SCNC: 106 MMOL/L — SIGNIFICANT CHANGE UP (ref 98–107)
CO2 SERPL-SCNC: 22 MMOL/L — SIGNIFICANT CHANGE UP (ref 22–31)
CREAT SERPL-MCNC: 0.7 MG/DL — SIGNIFICANT CHANGE UP (ref 0.5–1.3)
EGFR: 87 ML/MIN/1.73M2 — SIGNIFICANT CHANGE UP
GLUCOSE SERPL-MCNC: 84 MG/DL — SIGNIFICANT CHANGE UP (ref 70–99)
HCT VFR BLD CALC: 27.7 % — LOW (ref 34.5–45)
HGB BLD-MCNC: 8.9 G/DL — LOW (ref 11.5–15.5)
MAGNESIUM SERPL-MCNC: 1.7 MG/DL — SIGNIFICANT CHANGE UP (ref 1.6–2.6)
MCHC RBC-ENTMCNC: 28.8 PG — SIGNIFICANT CHANGE UP (ref 27–34)
MCHC RBC-ENTMCNC: 32.1 GM/DL — SIGNIFICANT CHANGE UP (ref 32–36)
MCV RBC AUTO: 89.6 FL — SIGNIFICANT CHANGE UP (ref 80–100)
NRBC # BLD: 0 /100 WBCS — SIGNIFICANT CHANGE UP (ref 0–0)
NRBC # FLD: 0 K/UL — SIGNIFICANT CHANGE UP (ref 0–0)
PHOSPHATE SERPL-MCNC: 2.6 MG/DL — SIGNIFICANT CHANGE UP (ref 2.5–4.5)
PLATELET # BLD AUTO: 225 K/UL — SIGNIFICANT CHANGE UP (ref 150–400)
POTASSIUM SERPL-MCNC: 3.7 MMOL/L — SIGNIFICANT CHANGE UP (ref 3.5–5.3)
POTASSIUM SERPL-SCNC: 3.7 MMOL/L — SIGNIFICANT CHANGE UP (ref 3.5–5.3)
RBC # BLD: 3.09 M/UL — LOW (ref 3.8–5.2)
RBC # FLD: 13.2 % — SIGNIFICANT CHANGE UP (ref 10.3–14.5)
RH IG SCN BLD-IMP: POSITIVE — SIGNIFICANT CHANGE UP
SODIUM SERPL-SCNC: 139 MMOL/L — SIGNIFICANT CHANGE UP (ref 135–145)
WBC # BLD: 9.01 K/UL — SIGNIFICANT CHANGE UP (ref 3.8–10.5)
WBC # FLD AUTO: 9.01 K/UL — SIGNIFICANT CHANGE UP (ref 3.8–10.5)

## 2023-06-01 PROCEDURE — 99232 SBSQ HOSP IP/OBS MODERATE 35: CPT | Mod: GC

## 2023-06-01 RX ORDER — ALPRAZOLAM 0.25 MG
0.25 TABLET ORAL EVERY 8 HOURS
Refills: 0 | Status: DISCONTINUED | OUTPATIENT
Start: 2023-06-01 | End: 2023-06-02

## 2023-06-01 RX ORDER — VERAPAMIL HCL 240 MG
180 CAPSULE, EXTENDED RELEASE PELLETS 24 HR ORAL DAILY
Refills: 0 | Status: DISCONTINUED | OUTPATIENT
Start: 2023-06-01 | End: 2023-06-02

## 2023-06-01 RX ADMIN — Medication 0.25 MILLIGRAM(S): at 15:01

## 2023-06-01 RX ADMIN — SIMVASTATIN 20 MILLIGRAM(S): 20 TABLET, FILM COATED ORAL at 21:13

## 2023-06-01 RX ADMIN — Medication 180 MILLIGRAM(S): at 09:41

## 2023-06-01 RX ADMIN — Medication 75 MICROGRAM(S): at 05:32

## 2023-06-01 RX ADMIN — Medication 975 MILLIGRAM(S): at 21:42

## 2023-06-01 RX ADMIN — Medication 1 TABLET(S): at 05:31

## 2023-06-01 RX ADMIN — Medication 12.5 MILLIGRAM(S): at 18:13

## 2023-06-01 RX ADMIN — Medication 12.5 MILLIGRAM(S): at 05:31

## 2023-06-01 RX ADMIN — Medication 975 MILLIGRAM(S): at 21:12

## 2023-06-01 RX ADMIN — RIVAROXABAN 20 MILLIGRAM(S): KIT at 18:12

## 2023-06-01 RX ADMIN — Medication 975 MILLIGRAM(S): at 06:00

## 2023-06-01 RX ADMIN — Medication 975 MILLIGRAM(S): at 05:31

## 2023-06-01 NOTE — DISCHARGE NOTE PROVIDER - HOSPITAL COURSE
77 y/o F PMH Afib (on Xarelto), HTN, HLD, OA (L knee; cortisone shot 2/23) c/o L hip pain s/p mechanical fall 4 days ago. 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 No pain at rest, but pain with certain positions/activity, including walking. No numbness/tingling in the LLE. Denies any other trauma/injuries at this time. In the ED CT was obtained which showed comminuted displaced fracture of the left femoral greater trochanter. Ortho was consulted who recommended MRI to eval for IT extension which showed acute displaced left femoral greater trochanteric fracture with intertrochanteric extension of a fracture line through almost the full diameter of the intertrochanteric left femoral neck and thickening of the endometrial stripe. On first night of admission pt was tachycardic in AF to the 130s, likely in the setting of pain and anxiety (pt notes significant anxiety as this is her first time in a hospital). Pt was restarted on her home medications with good control of her HR and stable BP. Pt was evaluated by cardiology who recommended TTE which showed no actionable abnormalities. Patient was taken for L hip IM nailing w/ ortho on 5/29. Pt was restarted on xarelto on 5/30. Hgb stabilized. PT evaluated pt and rec'd home PT. Pt medically stabilized for discharge on 6/2/23 with outpt f/u with ortho in 1 week and PMD in 1 week.

## 2023-06-01 NOTE — PROGRESS NOTE ADULT - SUBJECTIVE AND OBJECTIVE BOX
PETER LEDESMA  78y  Female      Patient is a 78y old  Female who presents with a chief complaint of Fall (01 Jun 2023 06:39)      INTERVAL HPI/OVERNIGHT EVENTS:      REVIEW OF SYSTEMS:  Constitutional: no fever and no chills  Eyes: no discharge, no pain, no visual changes  ENMT: no ear pain, no dysphagia or throat pain  Neck: no pain, no stiffness  CV: no chest pain, no palpitations, no edema  Resp: no cough, no shortness of breath  Abd: no abdominal pain, no nausea or vomiting, no diarrhea  : no dysuria, no hematuria  MSK: no back pain, no neck pain, no joint pain  Neuro: no LOC, no gait abnormality, no headache, no sensory deficits, no weakness  Skin: LLE bruising, no rashes    FAMILY HISTORY:  FH: dementia      T(C): 36.8 (06-01-23 @ 05:32), Max: 37.1 (06-01-23 @ 00:17)  HR: 114 (06-01-23 @ 05:32) (92 - 114)  BP: 141/92 (06-01-23 @ 05:32) (130/65 - 148/73)  RR: 18 (06-01-23 @ 05:32) (17 - 18)  SpO2: 100% (06-01-23 @ 05:32) (98% - 100%)  Wt(kg): --Vital Signs Last 24 Hrs  T(C): 36.8 (01 Jun 2023 05:32), Max: 37.1 (01 Jun 2023 00:17)  T(F): 98.3 (01 Jun 2023 05:32), Max: 98.7 (01 Jun 2023 00:17)  HR: 114 (01 Jun 2023 05:32) (92 - 114)  BP: 141/92 (01 Jun 2023 05:32) (130/65 - 148/73)  BP(mean): --  RR: 18 (01 Jun 2023 05:32) (17 - 18)  SpO2: 100% (01 Jun 2023 05:32) (98% - 100%)    Parameters below as of 01 Jun 2023 05:32  Patient On (Oxygen Delivery Method): room air      penicillin (Rash; Swelling; Pruritus)      PHYSICAL EXAM:  GENERAL: NAD, well-groomed, well-developed  HEAD:  Atraumatic, Normocephalic  EYES: EOMI, PERRLA, conjunctiva and sclera clear  ENMT: No tonsillar erythema, exudates, or enlargement; Moist mucous membranes, Good dentition, No lesions  NECK: Supple, No JVD, Normal thyroid  NERVOUS SYSTEM:  Alert & Oriented X3, Good concentration; Motor Strength 5/5 B/L upper and lower extremities; DTRs 2+ intact and symmetric  CHEST/LUNG: Clear to percussion bilaterally; No rales, rhonchi, wheezing, or rubs  HEART: Regular rate and rhythm; No murmurs, rubs, or gallops  ABDOMEN: Soft, Nontender, Nondistended; Bowel sounds present  EXTREMITIES:  2+ Peripheral Pulses, No clubbing, cyanosis, or edema  LYMPH: No lymphadenopathy noted  SKIN: LLE incision sites w/ dressing C/D/I, ecchymosis and swelling over medial and lateral L thigh improving from prior, no rashes    Consultant(s) Notes Reviewed:  [x ] YES  [ ] NO  Care Discussed with Consultants/Other Providers [ x] YES  [ ] NO    LABS:      RADIOLOGY & ADDITIONAL TESTS:    Imaging Personally Reviewed:  [ ] YES  [ ] NO  acetaminophen     Tablet .. 975 milliGRAM(s) Oral every 8 hours  bisacodyl Suppository 10 milliGRAM(s) Rectal daily PRN  levothyroxine 75 MICROGram(s) Oral daily  melatonin 3 milliGRAM(s) Oral at bedtime PRN  metoprolol tartrate 12.5 milliGRAM(s) Oral every 12 hours  oxyCODONE    IR 2.5 milliGRAM(s) Oral every 6 hours PRN  oxyCODONE    IR 5 milliGRAM(s) Oral every 6 hours PRN  polyethylene glycol 3350 17 Gram(s) Oral daily  povidone iodine 5% Nasal Swab 1 Application(s) Both Nostrils once  rivaroxaban 20 milliGRAM(s) Oral with dinner  senna 2 Tablet(s) Oral at bedtime  simvastatin 20 milliGRAM(s) Oral at bedtime      HEALTH ISSUES - PROBLEM Dx:  Hip fracture, left    Afib    HTN (hypertension)    HLD (hyperlipidemia)    Osteoarthritis    Hypothyroid    Leukocytosis    Prophylactic measure

## 2023-06-01 NOTE — PROGRESS NOTE ADULT - PROBLEM SELECTOR PLAN 1
S/p Mechanical fall; no headstrike, no LOC   Xray with Acute displaced fracture through the left greater trochanter.  CT bony pelvis w/ Comminuted displaced fracture of the left femoral greater trochanter.   CTH wnl   MR L hip showing acute displaced left femoral greater trochanteric fracture with intertrochanteric extension of a fracture line through almost the full diameter of the intertrochanteric left femoral neck    Plan   -s/p L hip IM nailing w/ ortho 5/29  -WBAT  -PT eval rec home PT

## 2023-06-01 NOTE — DISCHARGE NOTE PROVIDER - CARE PROVIDER_API CALL
Renan Damon Roosevelt  Orthopaedic Surgery  [address] [place] [address], [facility] [address]  [place] [address]  Phone: [phone]  Fax: [phone]  Follow Up Time: 1 week

## 2023-06-01 NOTE — PROGRESS NOTE ADULT - ASSESSMENT
78F hx Afib (on Xarelto LD 5/27), HTN, HLD, OA (L knee; cortisone shot 2/23) c/o L hip pain s/p mechanical fall 4 days ago. Limited weight bearing. Xray with Acute displaced fracture through the left greater trochanter.  CT w/ Comminuted displaced fracture of the left femoral greater trochanter s/p L hip IM nailing w/ ortho 5/29.

## 2023-06-01 NOTE — PROGRESS NOTE ADULT - PROBLEM SELECTOR PLAN 8
#Prophylactic  DVT: Xarelto  Diet: Regular    Dispo: Home PT    Outpatient follow up:  PCP: Dr. Osorio Watson  Cards: Tc Castillo MD  Ortho: IKE VASQUEZ MD   Pharmacy: [facility] [address] [place] [address]

## 2023-06-01 NOTE — DISCHARGE NOTE PROVIDER - NSDCCPCAREPLAN_GEN_ALL_CORE_FT
PRINCIPAL DISCHARGE DIAGNOSIS  Diagnosis: Hip fracture  Assessment and Plan of Treatment: You were seen in the hospital after a fall at work which caused a fracture of your left hip. This was repaired surgically with an intermedullary nail with the orthopedic team on 5/29. You were evaluated by physical therapy who recommended home PT. You were restarted on your home medications including xarelto and your blood counts were stable. You will be discharged home and should follow-up with Dr. Damon in 1 week. 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  PLEASE RETURN TO THE EMERGENCY DEPARTMENT IMMEDIATELY IF you have any fevers, uncontrollable nausea and vomiting, lightheadedness, inability to tolerate eating and drinking, difficulty breathing, severe increase in symptoms or pain, or an other new symptoms that concern you.        SECONDARY DISCHARGE DIAGNOSES  Diagnosis: Afib  Assessment and Plan of Treatment: Your heart rate remained stable in the hospital on your home medications. Continue to take your xarelto and other home medications as prescribed.    Diagnosis: Thickened endometrium  Assessment and Plan of Treatment: On your MRI it was found incidentally that the endometrium in your uterus may be thickened. You should follow-up with your primary doctor within 1 week to discuss your hospital visit and this finding, for which you should have a pelvic ultrasound performed for further evaluation and management. You may require a biopsy in the future if the ultrasound confirms thickening of the endometrium.

## 2023-06-01 NOTE — DISCHARGE NOTE PROVIDER - NSDCMRMEDTOKEN_GEN_ALL_CORE_FT
levothyroxine 75 mcg (0.075 mg) oral tablet: 1 orally once a day  metoprolol succinate 25 mg oral capsule, extended release: 1 orally once a day  simvastatin 20 mg oral tablet: 1 tab(s) orally once a day (at bedtime)  verapamil 180 mg/24 hours oral capsule, extended release: 1 orally  Xarelto 20 mg oral tablet: 1 orally once a day   acetaminophen 325 mg oral tablet: 3 tab(s) orally every 8 hours as needed for  moderate pain  levothyroxine 75 mcg (0.075 mg) oral tablet: 1 orally once a day  metoprolol succinate 25 mg oral capsule, extended release: 1 orally once a day  simvastatin 20 mg oral tablet: 1 tab(s) orally once a day (at bedtime)  verapamil 180 mg/24 hours oral capsule, extended release: 1 capsule, extended release orally once a day  Xarelto 20 mg oral tablet: 1 orally once a day

## 2023-06-01 NOTE — PROGRESS NOTE ADULT - PROBLEM SELECTOR PLAN 2
Home reigmen: Metoprolol succinate 25mg QD; Xarelto 20mg QD (last dose 5/27 2pm)  Chads vasc 4: age, HTN, female  Hasbled: 3 for age, htn, medications  ECG on admission: Afib   Pt reporting persistent anxiety during admission as this is her first time in the hospital  Tele reviewed 5/31 showing AF w/ HR 90s-low 100s    Plan  -C/w Xarelto  -C/w metoprolol tartrate 12.5 BID  -IV lopressor prn  -F/u cards recs  -CTM on tele

## 2023-06-01 NOTE — PROGRESS NOTE ADULT - PROBLEM SELECTOR PLAN 7
Leuckocytosis on admission  no fevers or chills here  CXR with clear lungs  no localizing infectious symptoms   Now resolved    Plan  -CTM off antibiotics

## 2023-06-01 NOTE — PROGRESS NOTE ADULT - SUBJECTIVE AND OBJECTIVE BOX
Orthopaedic Surgery Progress Note    Subjective:   Patient seen and examined. No acute events overnight. Walking up and down the hallway with PT. Pain well controlled on current regimen.     Objective:  Vital Signs Last 24 Hrs  T(C): 37.1 (01 Jun 2023 00:17), Max: 37.1 (01 Jun 2023 00:17)  T(F): 98.7 (01 Jun 2023 00:17), Max: 98.7 (01 Jun 2023 00:17)  HR: 100 (01 Jun 2023 00:17) (92 - 100)  BP: 148/73 (01 Jun 2023 00:17) (130/65 - 148/73)  BP(mean): --  RR: 18 (01 Jun 2023 00:17) (17 - 18)  SpO2: 100% (01 Jun 2023 00:17) (98% - 100%)    Parameters below as of 01 Jun 2023 00:17  Patient On (Oxygen Delivery Method): room air    PE    NAD  LLE:   pressure dressing c/d/i  ecchymosis over medial and lateral thigh unchanged from prior, no increase in swelling from prior  motor intact GS/TA/EHL  SILT S/S/SP/DP  WWP                                       8.6    9.79  )-----------( 221      ( 31 May 2023 06:45 )             26.8         78y Female w/ L GT fx with IT extension s/p IMN 5/29  - Pain control  - WBAT  - Pressure dressing in place, will change before discharge  - FU AM labs  - Staples to be removed POD14  - PT/OT/OOB  - DVT ppx: Xarelto 20mg  - Dispo planning: home pending stabilization of Hgb

## 2023-06-02 ENCOUNTER — TRANSCRIPTION ENCOUNTER (OUTPATIENT)
Age: 78
End: 2023-06-02

## 2023-06-02 VITALS
RESPIRATION RATE: 18 BRPM | OXYGEN SATURATION: 97 % | TEMPERATURE: 98 F | DIASTOLIC BLOOD PRESSURE: 60 MMHG | HEART RATE: 96 BPM | SYSTOLIC BLOOD PRESSURE: 121 MMHG

## 2023-06-02 LAB
ALBUMIN SERPL ELPH-MCNC: 3 G/DL — LOW (ref 3.3–5)
ALP SERPL-CCNC: 72 U/L — SIGNIFICANT CHANGE UP (ref 40–120)
ALT FLD-CCNC: 12 U/L — SIGNIFICANT CHANGE UP (ref 4–33)
ANION GAP SERPL CALC-SCNC: 11 MMOL/L — SIGNIFICANT CHANGE UP (ref 7–14)
AST SERPL-CCNC: 17 U/L — SIGNIFICANT CHANGE UP (ref 4–32)
BASOPHILS # BLD AUTO: 0.03 K/UL — SIGNIFICANT CHANGE UP (ref 0–0.2)
BASOPHILS NFR BLD AUTO: 0.4 % — SIGNIFICANT CHANGE UP (ref 0–2)
BILIRUB SERPL-MCNC: 0.9 MG/DL — SIGNIFICANT CHANGE UP (ref 0.2–1.2)
BUN SERPL-MCNC: 13 MG/DL — SIGNIFICANT CHANGE UP (ref 7–23)
CALCIUM SERPL-MCNC: 8.6 MG/DL — SIGNIFICANT CHANGE UP (ref 8.4–10.5)
CHLORIDE SERPL-SCNC: 105 MMOL/L — SIGNIFICANT CHANGE UP (ref 98–107)
CO2 SERPL-SCNC: 23 MMOL/L — SIGNIFICANT CHANGE UP (ref 22–31)
CREAT SERPL-MCNC: 0.7 MG/DL — SIGNIFICANT CHANGE UP (ref 0.5–1.3)
EGFR: 87 ML/MIN/1.73M2 — SIGNIFICANT CHANGE UP
EOSINOPHIL # BLD AUTO: 0.32 K/UL — SIGNIFICANT CHANGE UP (ref 0–0.5)
EOSINOPHIL NFR BLD AUTO: 4 % — SIGNIFICANT CHANGE UP (ref 0–6)
GLUCOSE SERPL-MCNC: 88 MG/DL — SIGNIFICANT CHANGE UP (ref 70–99)
HCT VFR BLD CALC: 29.3 % — LOW (ref 34.5–45)
HGB BLD-MCNC: 9.4 G/DL — LOW (ref 11.5–15.5)
IANC: 4.59 K/UL — SIGNIFICANT CHANGE UP (ref 1.8–7.4)
IMM GRANULOCYTES NFR BLD AUTO: 0.6 % — SIGNIFICANT CHANGE UP (ref 0–0.9)
LYMPHOCYTES # BLD AUTO: 2.35 K/UL — SIGNIFICANT CHANGE UP (ref 1–3.3)
LYMPHOCYTES # BLD AUTO: 29.4 % — SIGNIFICANT CHANGE UP (ref 13–44)
MAGNESIUM SERPL-MCNC: 1.8 MG/DL — SIGNIFICANT CHANGE UP (ref 1.6–2.6)
MCHC RBC-ENTMCNC: 30 PG — SIGNIFICANT CHANGE UP (ref 27–34)
MCHC RBC-ENTMCNC: 32.1 GM/DL — SIGNIFICANT CHANGE UP (ref 32–36)
MCV RBC AUTO: 93.6 FL — SIGNIFICANT CHANGE UP (ref 80–100)
MONOCYTES # BLD AUTO: 0.65 K/UL — SIGNIFICANT CHANGE UP (ref 0–0.9)
MONOCYTES NFR BLD AUTO: 8.1 % — SIGNIFICANT CHANGE UP (ref 2–14)
NEUTROPHILS # BLD AUTO: 4.59 K/UL — SIGNIFICANT CHANGE UP (ref 1.8–7.4)
NEUTROPHILS NFR BLD AUTO: 57.5 % — SIGNIFICANT CHANGE UP (ref 43–77)
NRBC # BLD: 0 /100 WBCS — SIGNIFICANT CHANGE UP (ref 0–0)
NRBC # FLD: 0 K/UL — SIGNIFICANT CHANGE UP (ref 0–0)
PHOSPHATE SERPL-MCNC: 3.1 MG/DL — SIGNIFICANT CHANGE UP (ref 2.5–4.5)
PLATELET # BLD AUTO: 230 K/UL — SIGNIFICANT CHANGE UP (ref 150–400)
POTASSIUM SERPL-MCNC: 3.5 MMOL/L — SIGNIFICANT CHANGE UP (ref 3.5–5.3)
POTASSIUM SERPL-SCNC: 3.5 MMOL/L — SIGNIFICANT CHANGE UP (ref 3.5–5.3)
PROT SERPL-MCNC: 5.2 G/DL — LOW (ref 6–8.3)
RBC # BLD: 3.13 M/UL — LOW (ref 3.8–5.2)
RBC # FLD: 13.1 % — SIGNIFICANT CHANGE UP (ref 10.3–14.5)
SODIUM SERPL-SCNC: 139 MMOL/L — SIGNIFICANT CHANGE UP (ref 135–145)
WBC # BLD: 7.99 K/UL — SIGNIFICANT CHANGE UP (ref 3.8–10.5)
WBC # FLD AUTO: 7.99 K/UL — SIGNIFICANT CHANGE UP (ref 3.8–10.5)

## 2023-06-02 PROCEDURE — 99239 HOSP IP/OBS DSCHRG MGMT >30: CPT

## 2023-06-02 RX ORDER — VERAPAMIL HCL 240 MG
1 CAPSULE, EXTENDED RELEASE PELLETS 24 HR ORAL
Qty: 0 | Refills: 0 | DISCHARGE

## 2023-06-02 RX ORDER — ACETAMINOPHEN 500 MG
3 TABLET ORAL
Qty: 0 | Refills: 0 | DISCHARGE
Start: 2023-06-02

## 2023-06-02 RX ADMIN — Medication 975 MILLIGRAM(S): at 18:04

## 2023-06-02 RX ADMIN — Medication 12.5 MILLIGRAM(S): at 06:40

## 2023-06-02 RX ADMIN — Medication 180 MILLIGRAM(S): at 06:40

## 2023-06-02 RX ADMIN — Medication 975 MILLIGRAM(S): at 15:53

## 2023-06-02 RX ADMIN — Medication 0.25 MILLIGRAM(S): at 12:25

## 2023-06-02 RX ADMIN — Medication 975 MILLIGRAM(S): at 06:40

## 2023-06-02 RX ADMIN — Medication 75 MICROGRAM(S): at 06:41

## 2023-06-02 NOTE — PROGRESS NOTE ADULT - PROBLEM SELECTOR PLAN 8
#Prophylactic  DVT: Xarelto  Diet: Regular    Dispo: Home PT    Outpatient follow up:  PCP: Dr. Osorio Watson  Cards: Tc Castillo MD  Ortho: IKE VASQUEZ MD   Pharmacy: [facility] [address] [place] [address]

## 2023-06-02 NOTE — PROGRESS NOTE ADULT - SUBJECTIVE AND OBJECTIVE BOX
Patient seen and examined. No acute events overnight. Walked up and down the hallway with PT. Pain well controlled on current regimen.     T(C): 36.7 (06-02-23 @ 00:32), Max: 37.7 (06-01-23 @ 20:30)  HR: 87 (06-02-23 @ 00:32) (87 - 121)  BP: 111/63 (06-02-23 @ 00:32) (111/63 - 146/66)  RR: 18 (06-02-23 @ 00:32) (18 - 18)  SpO2: 100% (06-02-23 @ 00:32) (98% - 100%)  Wt(kg): --  - Gen: NAD    NAD  LLE:   pressure dressing c/d/i  ecchymosis over medial and lateral thigh improving from prior, no increase in swelling from prior  motor intact GS/TA/EHL  SILT S/S/SP/DP  WWP      78y Female w/ L GT fx with IT extension s/p IMN 5/29    - Pain control  - WBAT  - Pressure dressing in place, will change before discharge  - FU AM labs  - Staples to be removed POD14  - PT/OT/OOB  - DVT ppx: Xarelto 20mg  - Dispo planning: home pending stabilization of Hgb

## 2023-06-02 NOTE — PROGRESS NOTE ADULT - ATTENDING COMMENTS
77 yo lady with htn, afib (on xarelto), OA (L knee; cortisone shot 2/23) p/w L hip pain s/p mechanical fall 4 days ago, found to have an acute comminuted displaced fracture of the left femoral greater trochanter.    MRI completed, confirming fracture  s/p L hip IM nailing w/ ortho yesterday 5/29, tolerated procedure well  PT recommending home w/home PT  restart xarelto when cleared by ortho  Continue the rest of the work up and management as stated above
79 yo lady with htn, afib (on xarelto), OA (L knee; cortisone shot 2/23) p/w L hip pain s/p mechanical fall 4 days ago, found to have an acute comminuted displaced fracture of the left femoral greater trochanter.    MRI completed, confirming fracture  s/p L hip IM nailing w/ ortho yesterday 5/29, tolerated procedure well  PT recommending home w/home PT  restart xarelto as directed by ortho  slight decrease in hb, expected after surgery, needs monitoring as per ortho. Recheck   Continue the rest of the work up and management as stated above .
79 yo lady with htn, afib (on xarelto), OA (L knee; cortisone shot 2/23) p/w L hip pain s/p mechanical fall 4 days ago, found to have an acute comminuted displaced fracture of the left femoral greater trochanter.    MRI completed, confirming fracture  s/p L hip IM nailing w/ ortho yesterday 5/29, tolerated procedure well  PT recommending home w/home PT  restarted xarelto as directed by ortho  slight decrease in hb, expected after surgery, thus far stable  Continue the rest of the work up and management as stated above
79 yo lady with htn, afib (on xarelto), OA (L knee; cortisone shot 2/23) p/w L hip pain s/p mechanical fall 4 days ago, found to have an acute comminuted displaced fracture of the left femoral greater trochanter.    MRI completed, confirming fracture  s/p L hip IM nailing w/ ortho yesterday 5/29, tolerated procedure well  PT recommending home w/home PT  restarted xarelto as directed by ortho  slight decrease in hb, expected after surgery, thus far stable  Continue the rest of the work up and management as stated above
77 yo lady with htn, afib (on xarelto), OA (L knee; cortisone shot 2/23) p/w L hip pain s/p mechanical fall 4 days ago, found to have an acute comminuted displaced fracture of the left femoral greater trochanter.    MRI completed, confirming fracture  cardiology consulted for cardiac optimization, pending TTE   Continue the rest of the work up and management as stated above.

## 2023-06-02 NOTE — PROGRESS NOTE ADULT - PROBLEM SELECTOR PLAN 6
Hx hypothyroidism  home regimen: Synthroid 75mcg QD    Plan:  -F/U outpatient, pt is monitored closely no need for labs here  -C/W home synthroid 75mcg QD
Hx hypothyroidism  home regimen: Synthroid 75mcg QD    Plan:  -F/U outpatient, pt is monitored closely no need for labs here  -C/W home synthroid 75mcg QD
follows with ortho IKE VASQUEZ MD  OA of left knee, chronic  Improved s/p cortisone shot in february    Plan:  -Pain control if needed
Hx hypothyroidism  home regimen: Synthroid 75mcg QD    Plan:  -F/U outpatient, pt is monitored closely no need for labs here  -C/W home synthroid 75mcg QD
Hx hypothyroidism  home regimen: Synthroid 75mcg QD    Plan:  -F/U outpatient, pt is monitored closely no need for labs here  -C/W home synthroid 75mcg QD

## 2023-06-02 NOTE — DISCHARGE NOTE NURSING/CASE MANAGEMENT/SOCIAL WORK - NSDCPEFALRISK_GEN_ALL_CORE
For information on Fall & Injury Prevention, visit: [email or web address] OR  [email or web address] OR  https://www.cdc.gov/steadi/patient.html

## 2023-06-02 NOTE — PROGRESS NOTE ADULT - PROBLEM SELECTOR PLAN 5
follows with ortho IKE VASQUEZ MD  OA of left knee, chronic  Improved s/p cortisone shot in february    Plan:  -Pain control if needed
follows with ortho IKE VASQUEZ MD  OA of left knee, chronic  Improved s/p cortisone shot in february    Plan:  -Pain control if needed
Home regimen: Simvastatin 20mg qhS    Plan:  -F/U outpatient, pt is monitored closely no need for labs here  -C/W Simvastatin 20mg qhS
follows with ortho IKE VASQUEZ MD  OA of left knee, chronic  Improved s/p cortisone shot in february    Plan:  -Pain control if needed
follows with ortho IKE VASQUEZ MD  OA of left knee, chronic  Improved s/p cortisone shot in february    Plan:  -Pain control if needed

## 2023-06-02 NOTE — PROGRESS NOTE ADULT - PROVIDER SPECIALTY LIST ADULT
Anesthesia
Orthopedics
Internal Medicine

## 2023-06-02 NOTE — DISCHARGE NOTE NURSING/CASE MANAGEMENT/SOCIAL WORK - NSDCVIVACCINE_GEN_ALL_CORE_FT
Td (adult) preservative free; 05-Jan-2014 14:00; Cassie Germain (RN); [id number]; IntraMuscular; Deltoid Left.; 0.5 milliLiter(s);

## 2023-06-02 NOTE — DISCHARGE NOTE NURSING/CASE MANAGEMENT/SOCIAL WORK - NSDCPNINST_GEN_ALL_CORE
Take medication as ordered, follow up with MD as advised, gradually increase activity, use walker or cane for support as needed, eat e heart healthy diet, call MD for fever over 100.4, redness, swelling,   drainage at incision site

## 2023-06-02 NOTE — PROGRESS NOTE ADULT - PROBLEM SELECTOR PLAN 2
Home reigmen: Metoprolol succinate 25mg QD; Xarelto 20mg QD (last dose 5/27 2pm)  Chads vasc 4: age, HTN, female  Hasbled: 3 for age, htn, medications  ECG on admission: Afib   Pt reporting persistent anxiety during admission as this is her first time in the hospital  Tele reviewed 5/31 showing AF w/ HR 90s-low 100s    Plan  -C/w Xarelto  -C/w metoprolol tartrate 12.5 BID  -IV lopressor prn  -F/u cards recs  -CTM on tele Home reigmen: Metoprolol succinate 25mg QD; Xarelto 20mg QD (last dose 5/27 2pm)  Chads vasc 4: age, HTN, female  Hasbled: 3 for age, htn, medications  ECG on admission: Afib   Pt reporting persistent anxiety during admission as this is her first time in the hospital  Tele reviewed 5/31 showing AF w/ HR 90s-low 100s    Plan  -C/w Xarelto  -C/w metoprolol tartrate 12.5 BID  -C/w verapamil 180 ER  -F/u cards recs  -CTM on tele

## 2023-06-02 NOTE — PROGRESS NOTE ADULT - SUBJECTIVE AND OBJECTIVE BOX
PETER LEDESMA  78y  Female      Patient is a 78y old  Female who presents with a chief complaint of Fall (02 Jun 2023 04:21)      INTERVAL HPI/OVERNIGHT EVENTS:      REVIEW OF SYSTEMS:  Constitutional: no fever and no chills  Eyes: no discharge, no pain, no visual changes  ENMT: no ear pain, no dysphagia or throat pain  Neck: no pain, no stiffness  CV: no chest pain, no palpitations, no edema  Resp: no cough, no shortness of breath  Abd: no abdominal pain, no nausea or vomiting, no diarrhea  : no dysuria, no hematuria  MSK: no back pain, no neck pain, no joint pain  Neuro: no LOC, no gait abnormality, no headache, no sensory deficits, no weakness  Skin: LLE bruising, no rashes    FAMILY HISTORY:  FH: dementia      T(C): 36.8 (06-02-23 @ 09:03), Max: 37.7 (06-01-23 @ 20:30)  HR: 74 (06-02-23 @ 09:03) (74 - 121)  BP: 126/58 (06-02-23 @ 09:03) (111/63 - 146/66)  RR: 18 (06-02-23 @ 09:03) (18 - 18)  SpO2: 100% (06-02-23 @ 09:03) (98% - 100%)  Wt(kg): --Vital Signs Last 24 Hrs  T(C): 36.8 (02 Jun 2023 09:03), Max: 37.7 (01 Jun 2023 20:30)  T(F): 98.2 (02 Jun 2023 09:03), Max: 99.8 (01 Jun 2023 20:30)  HR: 74 (02 Jun 2023 09:03) (74 - 121)  BP: 126/58 (02 Jun 2023 09:03) (111/63 - 146/66)  BP(mean): --  RR: 18 (02 Jun 2023 09:03) (18 - 18)  SpO2: 100% (02 Jun 2023 09:03) (98% - 100%)    Parameters below as of 02 Jun 2023 09:03  Patient On (Oxygen Delivery Method): room air      penicillin (Rash; Swelling; Pruritus)      PHYSICAL EXAM:  GENERAL: NAD, well-groomed, well-developed  HEAD:  Atraumatic, Normocephalic  EYES: EOMI, PERRLA, conjunctiva and sclera clear  ENMT: No tonsillar erythema, exudates, or enlargement; Moist mucous membranes, Good dentition, No lesions  NECK: Supple, No JVD, Normal thyroid  NERVOUS SYSTEM:  Alert & Oriented X3, Good concentration; Motor Strength 5/5 B/L upper and lower extremities; DTRs 2+ intact and symmetric  CHEST/LUNG: Clear to percussion bilaterally; No rales, rhonchi, wheezing, or rubs  HEART: Regular rate and rhythm; No murmurs, rubs, or gallops  ABDOMEN: Soft, Nontender, Nondistended; Bowel sounds present  EXTREMITIES:  2+ Peripheral Pulses, No clubbing, cyanosis, or edema  LYMPH: No lymphadenopathy noted  SKIN: LLE incision sites w/ dressing C/D/I, ecchymosis and swelling over medial and lateral L thigh improving from prior, no rashes    Consultant(s) Notes Reviewed:  [x ] YES  [ ] NO  Care Discussed with Consultants/Other Providers [ x] YES  [ ] NO    LABS:      RADIOLOGY & ADDITIONAL TESTS:    Imaging Personally Reviewed:  [ ] YES  [ ] NO  acetaminophen     Tablet .. 975 milliGRAM(s) Oral every 8 hours  ALPRAZolam 0.25 milliGRAM(s) Oral every 8 hours PRN  bisacodyl Suppository 10 milliGRAM(s) Rectal daily PRN  levothyroxine 75 MICROGram(s) Oral daily  melatonin 3 milliGRAM(s) Oral at bedtime PRN  metoprolol tartrate 12.5 milliGRAM(s) Oral every 12 hours  oxyCODONE    IR 5 milliGRAM(s) Oral every 6 hours PRN  oxyCODONE    IR 2.5 milliGRAM(s) Oral every 6 hours PRN  polyethylene glycol 3350 17 Gram(s) Oral daily  povidone iodine 5% Nasal Swab 1 Application(s) Both Nostrils once  rivaroxaban 20 milliGRAM(s) Oral with dinner  senna 2 Tablet(s) Oral at bedtime  simvastatin 20 milliGRAM(s) Oral at bedtime  verapamil  milliGRAM(s) Oral daily      HEALTH ISSUES - PROBLEM Dx:  Hip fracture, left    Afib    HTN (hypertension)    HLD (hyperlipidemia)    Osteoarthritis    Hypothyroid    Leukocytosis    Prophylactic measure             PETER LEDESMA  78y  Female      Patient is a 78y old  Female who presents with a chief complaint of Fall (02 Jun 2023 04:21)      INTERVAL HPI/OVERNIGHT EVENTS: No acute events overnight. Pt reports pain well controlled. Denies any fevers/chills, CP, SOB, abd pain, or n/v.      REVIEW OF SYSTEMS:  Constitutional: no fever and no chills  Eyes: no discharge, no pain, no visual changes  ENMT: no ear pain, no dysphagia or throat pain  Neck: no pain, no stiffness  CV: no chest pain, no palpitations, no edema  Resp: no cough, no shortness of breath  Abd: no abdominal pain, no nausea or vomiting, no diarrhea  : no dysuria, no hematuria  MSK: no back pain, no neck pain, no joint pain  Neuro: no LOC, no gait abnormality, no headache, no sensory deficits, no weakness  Skin: LLE bruising, no rashes    FAMILY HISTORY:  FH: dementia      T(C): 36.8 (06-02-23 @ 09:03), Max: 37.7 (06-01-23 @ 20:30)  HR: 74 (06-02-23 @ 09:03) (74 - 121)  BP: 126/58 (06-02-23 @ 09:03) (111/63 - 146/66)  RR: 18 (06-02-23 @ 09:03) (18 - 18)  SpO2: 100% (06-02-23 @ 09:03) (98% - 100%)  Wt(kg): --Vital Signs Last 24 Hrs  T(C): 36.8 (02 Jun 2023 09:03), Max: 37.7 (01 Jun 2023 20:30)  T(F): 98.2 (02 Jun 2023 09:03), Max: 99.8 (01 Jun 2023 20:30)  HR: 74 (02 Jun 2023 09:03) (74 - 121)  BP: 126/58 (02 Jun 2023 09:03) (111/63 - 146/66)  BP(mean): --  RR: 18 (02 Jun 2023 09:03) (18 - 18)  SpO2: 100% (02 Jun 2023 09:03) (98% - 100%)    Parameters below as of 02 Jun 2023 09:03  Patient On (Oxygen Delivery Method): room air      penicillin (Rash; Swelling; Pruritus)      PHYSICAL EXAM:  GENERAL: NAD, well-groomed, well-developed  HEAD:  Atraumatic, Normocephalic  EYES: EOMI, PERRLA, conjunctiva and sclera clear  ENMT: No tonsillar erythema, exudates, or enlargement; Moist mucous membranes, Good dentition, No lesions  NECK: Supple, No JVD, Normal thyroid  NERVOUS SYSTEM:  Alert & Oriented X3, Good concentration; Motor Strength 5/5 B/L upper and lower extremities; DTRs 2+ intact and symmetric  CHEST/LUNG: Clear to percussion bilaterally; No rales, rhonchi, wheezing, or rubs  HEART: Regular rate and rhythm; No murmurs, rubs, or gallops  ABDOMEN: Soft, Nontender, Nondistended; Bowel sounds present  EXTREMITIES:  2+ Peripheral Pulses, No clubbing, cyanosis, or edema  LYMPH: No lymphadenopathy noted  SKIN: LLE incision sites w/ dressing C/D/I, ecchymosis and swelling over medial and lateral L thigh improving from prior, no rashes    Consultant(s) Notes Reviewed:  [x ] YES  [ ] NO  Care Discussed with Consultants/Other Providers [ x] YES  [ ] NO    LABS:      RADIOLOGY & ADDITIONAL TESTS:    Imaging Personally Reviewed:  [ ] YES  [ ] NO  acetaminophen     Tablet .. 975 milliGRAM(s) Oral every 8 hours  ALPRAZolam 0.25 milliGRAM(s) Oral every 8 hours PRN  bisacodyl Suppository 10 milliGRAM(s) Rectal daily PRN  levothyroxine 75 MICROGram(s) Oral daily  melatonin 3 milliGRAM(s) Oral at bedtime PRN  metoprolol tartrate 12.5 milliGRAM(s) Oral every 12 hours  oxyCODONE    IR 5 milliGRAM(s) Oral every 6 hours PRN  oxyCODONE    IR 2.5 milliGRAM(s) Oral every 6 hours PRN  polyethylene glycol 3350 17 Gram(s) Oral daily  povidone iodine 5% Nasal Swab 1 Application(s) Both Nostrils once  rivaroxaban 20 milliGRAM(s) Oral with dinner  senna 2 Tablet(s) Oral at bedtime  simvastatin 20 milliGRAM(s) Oral at bedtime  verapamil  milliGRAM(s) Oral daily      HEALTH ISSUES - PROBLEM Dx:  Hip fracture, left    Afib    HTN (hypertension)    HLD (hyperlipidemia)    Osteoarthritis    Hypothyroid    Leukocytosis    Prophylactic measure

## 2023-06-02 NOTE — DISCHARGE NOTE NURSING/CASE MANAGEMENT/SOCIAL WORK - PATIENT PORTAL LINK FT
You can access the FollowMyHealth Patient Portal offered by [facility] by registering at the following website: [email or web address]. By joining [employer]’s FollowMyHealth portal, you will also be able to view your health information using other applications (apps) compatible with our system.

## 2023-06-02 NOTE — PROGRESS NOTE ADULT - PROBLEM SELECTOR PLAN 3
Home regimen: Verapamil 180mg QD and metoprolol succinate 25mg ER    Plan:  -Hold verapamil for now, BPs wnl  - c/w metop tartrate as above  -CTM BPs Home regimen: Verapamil 180mg QD and metoprolol succinate 25mg ER    Plan:  - c/w metop tartrate and verapamil as above  -CTM BPs

## 2023-06-05 PROBLEM — I10 ESSENTIAL (PRIMARY) HYPERTENSION: Chronic | Status: ACTIVE | Noted: 2023-05-28

## 2023-06-05 PROBLEM — M19.90 UNSPECIFIED OSTEOARTHRITIS, UNSPECIFIED SITE: Chronic | Status: ACTIVE | Noted: 2023-05-28

## 2023-06-05 PROBLEM — E78.5 HYPERLIPIDEMIA, UNSPECIFIED: Chronic | Status: ACTIVE | Noted: 2023-05-28

## 2023-06-05 PROBLEM — I48.91 UNSPECIFIED ATRIAL FIBRILLATION: Chronic | Status: ACTIVE | Noted: 2023-05-28

## 2023-06-12 ENCOUNTER — APPOINTMENT (OUTPATIENT)
Dept: ORTHOPEDIC SURGERY | Facility: CLINIC | Age: 78
End: 2023-06-12
Payer: OTHER MISCELLANEOUS

## 2023-06-14 ENCOUNTER — APPOINTMENT (OUTPATIENT)
Dept: ORTHOPEDIC SURGERY | Facility: CLINIC | Age: 78
End: 2023-06-14
Payer: OTHER MISCELLANEOUS

## 2023-06-14 PROCEDURE — 99024 POSTOP FOLLOW-UP VISIT: CPT

## 2023-06-14 PROCEDURE — 73502 X-RAY EXAM HIP UNI 2-3 VIEWS: CPT | Mod: LT

## 2023-06-22 ENCOUNTER — APPOINTMENT (OUTPATIENT)
Dept: ORTHOPEDIC SURGERY | Facility: CLINIC | Age: 78
End: 2023-06-22
Payer: OTHER MISCELLANEOUS

## 2023-06-22 PROCEDURE — 99024 POSTOP FOLLOW-UP VISIT: CPT

## 2023-06-22 PROCEDURE — 73502 X-RAY EXAM HIP UNI 2-3 VIEWS: CPT

## 2023-06-27 ENCOUNTER — FORM ENCOUNTER (OUTPATIENT)
Age: 78
End: 2023-06-27

## 2023-06-28 NOTE — DISCUSSION/SUMMARY
[de-identified] : 78-year-old woman s/p left hip short cephalomedullary nail, approximately 3.5 weeks out, doing well\par -X-ray and physical exam findings were discussed with the patient\par -Weightbearing as tolerated left LE\par -Physical therapy: to improve ROM and prevent stiffness, gait training\par -Follow-up in 2 months with x-rays of the left hip and AP pelvis at that time\par -All the patient's questions and concerns were addressed during this visit\par

## 2023-06-28 NOTE — PHYSICAL EXAM
[de-identified] : The patient is sitting comfortably in the exam room. \par Left hip\par -Skin is intact, no swelling, no ecchymosis\par -incisions clean and dry, no erythema\par -No tenderness to palpation over the greater trochanter\par -Painless range of motion hip\par -Flexion: , Internal rotation: , External rotation:\par -Sensation is intact L1-S1\par -5/5 EHL, FHL, TA, GS, quadriceps, hamstrings\par -Foot is warm and well-perfused, palpable dorsalis pedis pulse\par \par Range of Motion:  [de-identified] :  Xrays of the left hip and AP pelvis were taken in the office today, 6/22/23.  AP pelvis shows good overall alignment of the pelvis the sacroiliac joints are well aligned.  The pubic symphysis is well reduced.  Patient is status post short cephalomedullary nail of left hip.  Implants are in excellent position.\par

## 2023-06-28 NOTE — DISCUSSION/SUMMARY
[de-identified] : 78-year-old woman s/p left hip short cephalomedullary nail, approximately 3.5 weeks out, doing well\par -X-ray and physical exam findings were discussed with the patient\par -Weightbearing as tolerated left LE\par -Physical therapy: to improve ROM and prevent stiffness, gait training\par -Follow-up in 2 months with x-rays of the left hip and AP pelvis at that time\par -All the patient's questions and concerns were addressed during this visit\par

## 2023-06-28 NOTE — HISTORY OF PRESENT ILLNESS
[Has the patient missed work because of the injury/illness?] : The patient has missed work because of the injury/illness. [No] : The patient is currently not working. [de-identified] : PETER Felder is an 78 y.o. woman who presents to the office s/p left hip short intramedullary nail for greater trochanteric fracture with extension into the intertrochanteric region on 5/29/23 with Dr. Damon and Dr. Priscilla Garcia. Since her surgery she states she is feeling better. She had at home PT come for 2 weeks after her surgery. She is ambulating with a cane. She denies pain at the left hip. She occasionally takes Tylenol for pain with relief.  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  [FreeTextEntry4] : s/p left hip IM nail by Dr. Damon on 5/29/23. [FreeTextEntry5] : no [FreeTextEntry6] : 5/25/23

## 2023-06-28 NOTE — HISTORY OF PRESENT ILLNESS
[Has the patient missed work because of the injury/illness?] : The patient has missed work because of the injury/illness. [No] : The patient is currently not working. [de-identified] : PETER Felder is an 78 y.o. woman who presents to the office s/p left hip short intramedullary nail for greater trochanteric fracture with extension into the intertrochanteric region on 5/29/23 with Dr. Damon and Dr. Priscilla Garcia. Since her surgery she states she is feeling better. She had at home PT come for 2 weeks after her surgery. She is ambulating with a cane. She denies pain at the left hip. She occasionally takes Tylenol for pain with relief.  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  [FreeTextEntry4] : s/p left hip IM nail by Dr. Damon on 5/29/23. [FreeTextEntry5] : no [FreeTextEntry6] : 5/25/23

## 2023-06-28 NOTE — PHYSICAL EXAM
[de-identified] : The patient is sitting comfortably in the exam room. \par Left hip\par -Skin is intact, no swelling, no ecchymosis\par -incisions clean and dry, no erythema\par -No tenderness to palpation over the greater trochanter\par -Painless range of motion hip\par -Flexion: , Internal rotation: , External rotation:\par -Sensation is intact L1-S1\par -5/5 EHL, FHL, TA, GS, quadriceps, hamstrings\par -Foot is warm and well-perfused, palpable dorsalis pedis pulse\par \par Range of Motion:  [de-identified] :  Xrays of the left hip and AP pelvis were taken in the office today, 6/22/23.  AP pelvis shows good overall alignment of the pelvis the sacroiliac joints are well aligned.  The pubic symphysis is well reduced.  Patient is status post short cephalomedullary nail of left hip.  Implants are in excellent position.\par

## 2023-07-04 ENCOUNTER — FORM ENCOUNTER (OUTPATIENT)
Age: 78
End: 2023-07-04

## 2023-07-10 ENCOUNTER — FORM ENCOUNTER (OUTPATIENT)
Age: 78
End: 2023-07-10

## 2023-07-12 ENCOUNTER — APPOINTMENT (OUTPATIENT)
Dept: ORTHOPEDIC SURGERY | Facility: CLINIC | Age: 78
End: 2023-07-12

## 2023-08-24 ENCOUNTER — APPOINTMENT (OUTPATIENT)
Dept: ORTHOPEDIC SURGERY | Facility: CLINIC | Age: 78
End: 2023-08-24
Payer: OTHER MISCELLANEOUS

## 2023-08-24 DIAGNOSIS — S72.142D DISPLACED INTERTROCHANTERIC FRACTURE OF LEFT FEMUR, SUBSEQUENT ENCOUNTER FOR CLOSED FRACTURE WITH ROUTINE HEALING: ICD-10-CM

## 2023-08-24 PROCEDURE — 99213 OFFICE O/P EST LOW 20 MIN: CPT | Mod: 24

## 2023-08-24 PROCEDURE — 73502 X-RAY EXAM HIP UNI 2-3 VIEWS: CPT

## 2023-08-24 NOTE — DISCUSSION/SUMMARY
[de-identified] : 78-year-old woman s/p left hip short cephalomedullary nail, approximately 3 months out, doing great -X-ray and physical exam findings were discussed with the patient -Weightbearing as tolerated left LE -Physical therapy: to improve ROM and prevent stiffness, gait training -Follow-up in 3 months with x-rays of the left hip and AP pelvis at that time -All the patient's questions and concerns were addressed during this visit

## 2023-08-24 NOTE — HISTORY OF PRESENT ILLNESS
[Has the patient missed work because of the injury/illness?] : The patient has missed work because of the injury/illness. [No] : The patient is currently not working. [de-identified] : PETER Felder is an 78 y.o. woman who presents to the office s/p left hip short intramedullary nail for greater trochanteric fracture with extension into the intertrochanteric region on 5/29/23 with Dr. Damon and Dr. Priscilla Garcia. Since her surgery she states she is feeling  [FreeTextEntry1] : She tripped over a student in the cafeteria on 5/24/23 and landed on her left hip.  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  [FreeTextEntry4] : s/p left hip IM nail by Dr. Damon on 5/29/23. [FreeTextEntry5] : no [FreeTextEntry6] : 5/25/23

## 2023-08-24 NOTE — PHYSICAL EXAM
[de-identified] : The patient is sitting comfortably in the exam room.  Left hip -Skin is intact, no swelling, no ecchymosis -incisions well-healed, no erythema -No tenderness to palpation over the greater trochanter -Painless range of motion hip -Sensation is intact L1-S1 -5/5 EHL, FHL, TA, GS, quadriceps, hamstrings -Foot is warm and well-perfused, palpable dorsalis pedis pulse [de-identified] :  Xrays of the left hip and AP pelvis were taken in the office today, 8/24/23.  AP pelvis shows good overall alignment of the pelvis the sacroiliac joints are well aligned.  The pubic symphysis is well reduced.  Patient is status post short cephalomedullary nail of left hip.  Implants are in excellent position.  There is interval callus around the greater trochanter.  No lucencies around the screws

## 2023-11-29 ENCOUNTER — APPOINTMENT (OUTPATIENT)
Dept: ORTHOPEDIC SURGERY | Facility: CLINIC | Age: 78
End: 2023-11-29

## 2024-03-14 NOTE — PHYSICAL THERAPY INITIAL EVALUATION ADULT - RANGE OF MOTION EXAMINATION, REHAB EVAL
Detail Level: Zone
Initiate Treatment: Efudex bid on face for 10/days.
Render In Strict Bullet Format?: No
Continue Regimen: Efudex bid for 7 more day on scalp, total treatment 14 days.
bilateral upper extremity ROM was WFL (within functional limits)/bilateral lower extremity ROM was WFL (within functional limits)

## 2025-07-28 ENCOUNTER — APPOINTMENT (OUTPATIENT)
Dept: ORTHOPEDIC SURGERY | Facility: CLINIC | Age: 80
End: 2025-07-28
Payer: MEDICARE

## 2025-07-28 VITALS — WEIGHT: 147 LBS | HEIGHT: 61 IN | BODY MASS INDEX: 27.75 KG/M2

## 2025-07-28 DIAGNOSIS — M17.12 UNILATERAL PRIMARY OSTEOARTHRITIS, LEFT KNEE: ICD-10-CM

## 2025-07-28 PROCEDURE — 99214 OFFICE O/P EST MOD 30 MIN: CPT | Mod: 25

## 2025-07-28 PROCEDURE — 20611 DRAIN/INJ JOINT/BURSA W/US: CPT | Mod: LT

## 2025-07-28 PROCEDURE — 73564 X-RAY EXAM KNEE 4 OR MORE: CPT | Mod: LT

## 2025-07-28 PROCEDURE — 99204 OFFICE O/P NEW MOD 45 MIN: CPT | Mod: 25

## 2025-07-28 RX ORDER — VERAPAMIL HYDROCHLORIDE 180 MG/1
180 TABLET ORAL
Refills: 0 | Status: ACTIVE | COMMUNITY

## 2025-07-28 RX ORDER — SIMVASTATIN 20 MG/1
20 TABLET, FILM COATED ORAL
Refills: 0 | Status: ACTIVE | COMMUNITY

## 2025-07-28 RX ORDER — RIVAROXABAN 20 MG/1
20 TABLET, FILM COATED ORAL
Refills: 0 | Status: ACTIVE | COMMUNITY

## 2025-07-28 RX ORDER — LEVOTHYROXINE SODIUM 0.07 MG/1
75 TABLET ORAL
Refills: 0 | Status: ACTIVE | COMMUNITY

## 2025-07-28 RX ORDER — METOPROLOL TARTRATE 25 MG/1
25 TABLET ORAL
Refills: 0 | Status: ACTIVE | COMMUNITY

## (undated) DEVICE — CANISTER DISPOSABLE THIN WALL 3000CC

## (undated) DEVICE — DRAPE SHOWER CURTAIN ISOLATION

## (undated) DEVICE — DRAPE COVER SNAP 36X30"

## (undated) DEVICE — VENODYNE/SCD SLEEVE CALF MEDIUM

## (undated) DEVICE — PACK LIJ BASIC ORTHO

## (undated) DEVICE — DRSG TAPE MICROFOAM 4"

## (undated) DEVICE — SOL IRR BAG NS 0.9% 3000ML

## (undated) DEVICE — DRSG COBAN 4"

## (undated) DEVICE — DRSG WEBRIL 4"

## (undated) DEVICE — SUT VICRYL 2-0 27" CP-1 UNDYED

## (undated) DEVICE — LIJ-CONSIGN SYNTHES TFN PERCUTANEOUS & LAG SCREW INST: Type: DURABLE MEDICAL EQUIPMENT

## (undated) DEVICE — LIJ-CONSIGN SYNTHES TITANIUM TFN LOCKING SET: Type: DURABLE MEDICAL EQUIPMENT

## (undated) DEVICE — ELCTR GROUNDING PAD ADULT COVIDIEN

## (undated) DEVICE — CONNECTOR 5 IN1 SUCTION TUBING

## (undated) DEVICE — SUT VICRYL 1 36" CTX UNDYED

## (undated) DEVICE — LAP PAD W RING 18 X 18"

## (undated) DEVICE — DRILL BIT STRYKER ORTHO 4.2 X 340MM

## (undated) DEVICE — STAPLER SKIN MULTI DIRECTION W35

## (undated) DEVICE — LIJ-STRYKER GAMMA 3 PLUS SYSTEM BASIC SET: Type: DURABLE MEDICAL EQUIPMENT

## (undated) DEVICE — SOL IRR POUR H2O 1500ML

## (undated) DEVICE — DRAPE U POLY BLUE 60"X60"

## (undated) DEVICE — POSITIONER STRAP ARMBOARD VELCRO TS-30

## (undated) DEVICE — TRAP SPECIMEN SPUTUM 40CC

## (undated) DEVICE — PREP CHLORAPREP HI-LITE ORANGE 26ML

## (undated) DEVICE — SUT VICRYL 0 27" OS-6 UNDYED

## (undated) DEVICE — SUT VICRYL 2-0 27" FS-1 UNDYED

## (undated) DEVICE — DRSG ADAPTIC 3 X 3"

## (undated) DEVICE — LABELS BLANK W PEN